# Patient Record
Sex: FEMALE | Race: WHITE | Employment: FULL TIME | URBAN - METROPOLITAN AREA
[De-identification: names, ages, dates, MRNs, and addresses within clinical notes are randomized per-mention and may not be internally consistent; named-entity substitution may affect disease eponyms.]

---

## 2017-01-20 RX ORDER — PANTOPRAZOLE SODIUM 20 MG/1
TABLET, DELAYED RELEASE ORAL
Qty: 60 TAB | Refills: 2 | Status: SHIPPED | OUTPATIENT
Start: 2017-01-20 | End: 2017-02-21 | Stop reason: SDUPTHER

## 2017-02-21 NOTE — TELEPHONE ENCOUNTER
Pt states she takes 40mg twice daily(2 of the 20mg caps). Can this be changed to a 40mg capsule instead of a 20mg capsule?

## 2017-02-23 RX ORDER — PANTOPRAZOLE SODIUM 20 MG/1
TABLET, DELAYED RELEASE ORAL
Qty: 60 TAB | Refills: 2 | Status: SHIPPED | OUTPATIENT
Start: 2017-02-23 | End: 2019-02-25

## 2017-04-19 ENCOUNTER — OFFICE VISIT (OUTPATIENT)
Dept: HEMATOLOGY | Age: 72
End: 2017-04-19

## 2017-04-19 VITALS
HEART RATE: 79 BPM | HEIGHT: 66 IN | OXYGEN SATURATION: 98 % | SYSTOLIC BLOOD PRESSURE: 150 MMHG | WEIGHT: 165.13 LBS | BODY MASS INDEX: 26.54 KG/M2 | TEMPERATURE: 97.4 F | DIASTOLIC BLOOD PRESSURE: 75 MMHG

## 2017-04-19 DIAGNOSIS — B18.2 CHRONIC HEPATITIS C WITHOUT HEPATIC COMA (HCC): Primary | ICD-10-CM

## 2017-04-19 LAB
BASOPHILS # BLD AUTO: 0 X10E3/UL (ref 0–0.2)
BASOPHILS NFR BLD AUTO: 1 %
EOSINOPHIL # BLD AUTO: 0.2 X10E3/UL (ref 0–0.4)
EOSINOPHIL NFR BLD AUTO: 4 %
ERYTHROCYTE [DISTWIDTH] IN BLOOD BY AUTOMATED COUNT: 15.7 % (ref 12.3–15.4)
HCT VFR BLD AUTO: 34.7 % (ref 34–46.6)
HGB BLD-MCNC: 11 G/DL (ref 11.1–15.9)
IMM GRANULOCYTES # BLD: 0 X10E3/UL (ref 0–0.1)
IMM GRANULOCYTES NFR BLD: 0 %
LYMPHOCYTES # BLD AUTO: 1.4 X10E3/UL (ref 0.7–3.1)
LYMPHOCYTES NFR BLD AUTO: 28 %
MCH RBC QN AUTO: 25.6 PG (ref 26.6–33)
MCHC RBC AUTO-ENTMCNC: 31.7 G/DL (ref 31.5–35.7)
MCV RBC AUTO: 81 FL (ref 79–97)
MONOCYTES # BLD AUTO: 0.4 X10E3/UL (ref 0.1–0.9)
MONOCYTES NFR BLD AUTO: 8 %
NEUTROPHILS # BLD AUTO: 3 X10E3/UL (ref 1.4–7)
NEUTROPHILS NFR BLD AUTO: 59 %
PLATELET # BLD AUTO: 293 X10E3/UL (ref 150–379)
RBC # BLD AUTO: 4.3 X10E6/UL (ref 3.77–5.28)
WBC # BLD AUTO: 4.9 X10E3/UL (ref 3.4–10.8)

## 2017-04-19 NOTE — MR AVS SNAPSHOT
Visit Information Date & Time Provider Department Dept. Phone Encounter #  
 4/19/2017 10:30 AM Juan Alberto Estes NP Liver [facility] [phone] [id number] Follow-up Instructions Return in about 9 months (around 1/19/2018). Your Appointments 4/19/2017 10:30 AM  
Follow Up with Juan Alberto Estes NP [facility] ([address]) Appt Note: follow up [address] [facility] [phone] [facility] [address]  
[address] [place] [address] [facility] [address]  
  
    
 1/18/2018  1:00 PM  
Follow Up with Jessie Phan NP [facility] ([address]) Appt Note: Follow up [address] [place] [phone] [facility] [address]  
[address] [place] [address] Upcoming Health Maintenance Date Due INFLUENZA AGE 9 TO ADULT 8/1/2016 Pneumococcal 65+ Low/Medium Risk (2 of 2 - PPSV23) 3/19/2017 FOBT Q 1 YEAR AGE 50-75 5/27/2017 GLAUCOMA SCREENING Q2Y 8/11/2017 MEDICARE YEARLY EXAM 8/12/2017 BREAST CANCER SCRN MAMMOGRAM 9/22/2018 DTaP/Tdap/Td series (2 - Td) 8/11/2025 Allergies as of 4/19/2017  Review Complete On: 4/19/2017 By: Ramy Choi LPN Severity Noted Reaction Type Reactions Ciprofloxacin  03/23/2014    Other (comments) Hand pain Methylprednisolone  04/11/2011    Other (comments) Dizziness and felt disoriented Naprosyn [Naproxen]  01/08/2010    Vertigo Unsure of reaction. Prevacid [Lansoprazole]  04/02/2010    Other (comments) Ulcers in mouth. Sulfa (Sulfonamide Antibiotics)  01/08/2010    Unknown (comments) As child. Unsure of reaction. Tegretol [Carbamazepine]  04/11/2011    Hives Current Immunizations  Reviewed on 1/26/2016 Name Date Influenza Vaccine 10/1/2013 Pneumococcal Vaccine (Unspecified Type) 3/19/2012, 1/1/2011 Tdap 8/11/2015 Zoster Vaccine, Live 8/11/2015 Not reviewed this visit You Were Diagnosed With   
  
 Codes Comments Chronic hepatitis C without hepatic coma (HCC)    -  Primary ICD-10-CM: B18.2 ICD-9-CM: 070.54 Vitals BP Pulse Temp Height(growth percentile) Weight(growth percentile) SpO2  
 150/75 79 97.4 °F (36.3 °C) (Tympanic) 5' 6\" (1.676 m) 165 lb 2 oz (74.9 kg) 98% BMI OB Status Smoking Status 26.65 kg/m2 Hysterectomy Former Smoker BMI and BSA Data Body Mass Index Body Surface Area  
 26.65 kg/m 2 1.87 m 2 Preferred Pharmacy Pharmacy Name Phone [place] [id number] [address] [phone] Your Updated Medication List  
  
   
This list is accurate as of: 4/19/17  9:10 AM.  Always use your most recent med list.  
  
  
  
  
 diazePAM 5 mg tablet Commonly known as:  VALIUM Take 1 Tab by mouth every six (6) hours as needed for Anxiety. ferrous sulfate 325 mg (65 mg iron) tablet Take  by mouth Daily (before breakfast). ibuprofen 600 mg tablet Commonly known as:  MOTRIN Take 1 Tab by mouth every six (6) hours as needed for Pain. ondansetron 4 mg disintegrating tablet Commonly known as:  ZOFRAN ODT Take 1 Tab by mouth every eight (8) hours as needed for Nausea. pantoprazole 20 mg tablet Commonly known as:  PROTONIX  
TAKE 1 TABLET EVERY DAY PARoxetine 20 mg tablet Commonly known as:  PAXIL TAKE 1 TABLET EVERY DAY  
  
 ROGAINE 2 % external solution Generic drug:  minoxidil Apply  to affected area daily. therapeutic multivitamin tablet Commonly known as:  [facility] Take 1 Tab by mouth daily. We Performed the Following CBC WITH AUTOMATED DIFF [54969 CPT(R)] HEP C GENOTYPE [id number] CPT(R)] HEPATIC FUNCTION PANEL [29296 CPT(R)] METABOLIC PANEL, BASIC [57285 CPT(R)] Follow-up Instructions Return in about 9 months (around 1/19/2018). Introducing [facility]! Dear Jose Parikh: Thank you for requesting a [employer] account. Our records indicate that you already have an active [employer] account. You can access your account anytime at [email or web address] Did you know that you can access your hospital and ER discharge instructions at any time in [employer]? You can also review all of your test results from your hospital stay or ER visit. Additional Information If you have questions, please visit the Frequently Asked Questions section of the [employer] website at [email or web address] Remember, [employer] is NOT to be used for urgent needs. For medical emergencies, dial 911. Now available from your iPhone and Android! Please provide this summary of care documentation to your next provider. Your primary care clinician is listed as John Whitfield. If you have any questions after today's visit, please call [phone].

## 2017-04-19 NOTE — PROGRESS NOTES
Sis Cortez MD, ZANA Gonzales PA-C Bernhard Reasons, MD, [address], Alla Runner, MD Amy Jarrett Short, NP Amber Finner, NP        [facility]     [address], [address] [address]     [phone]     FAX: [address], [address] [facility], [address]     [phone]     FAX: [phone]       Patient Care Team:  Kaur Holt MD as PCP - General (Family Practice)  Jeaneth Patrick MD (Gastroenterology)      Problem List  Date Reviewed: 11/2/2016          Codes Class Noted    Chronic hepatitis C ([facility] [address] ICD-10-CM: B18.2  ICD-9-CM: 070.54  10/3/2016        S/P JESUS (total abdominal hysterectomy) ICD-10-CM: Z90.710  ICD-9-CM: V88.01  10/3/2016        H/O cervical spine surgery ICD-10-CM: Z98.890  ICD-9-CM: V45.89  10/3/2016        GI bleed ICD-10-CM: K92.2  ICD-9-CM: 578.9  1/24/2016    Overview Addendum 8/11/2016 11:57 AM by Angeline Devine MD     Duodenal AVMs. In 1/16, Hgb dropped from 13 to 5.9 during a bleed. 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 She began HCV on 11/13/2016 with once daily Harvoni and completed the 8 weeks of therapy on 01/07/2017. The HCV has not been treated before this.       The active problem list, all pertinent past medical history, medications, liver histology, endoscopic studies, radiologic findings and laboratory findings related to the liver disorder were reviewed with the patient. The patient is a 73 y.o.  female who was tested positive for chronic HCV during birth cohort screening in 8/2016. Risk factors for acquiring HCV are blood transfusions following childbirth in 1965. There was no history of acute incteric hepatitis at the time of these risk factors. Ultrasound of the liver was performed in 8/2016. The results of the imaging demonstrated a normal appearing liver. A liver biopsy has not been performed. Fibroscan analysis wasperformed in lieu of biopsy. The fibroscan suggests mild disease, F1/F2. Portal fibrosis. The patient has never received treatment for chronic HCV. The most recent laboratory studies indicate that the liver transaminases are normal, alkaline phosphatase is normal, tests of hepatic synthetic and metabolic function are normal, and the platelet count is normal.      The patient has no symptoms which can be attributed to the liver disorder. The patient completes all daily activities without any functional limitations. The patient has not experienced fatigue, fevers, chills, shortness of breath, chest pain, pain in the right side over the liver, diffuse abdominal pain, nausea, vomiting, constipation, diarrhrea, dry eyes, dry mouth, arthralgias, myalgias, yellowing of the eyes or skin, itching, dark urine, problems concentrating, swelling of the abdomen, swelling of the lower extremities, hematemesis, or hematochezia. ALLERGIES  Allergies   Allergen Reactions    Ciprofloxacin Other (comments)     Hand pain    Methylprednisolone Other (comments)     Dizziness and felt disoriented    Naprosyn [Naproxen] Vertigo     Unsure of reaction.  Prevacid [Lansoprazole] Other (comments)     Ulcers in mouth.  Sulfa (Sulfonamide Antibiotics) Unknown (comments)     As child. Unsure of reaction.     Tegretol [Carbamazepine] Hives MEDICATIONS  Current Outpatient Prescriptions   Medication Sig    pantoprazole (PROTONIX) 20 mg tablet TAKE 1 TABLET EVERY DAY    ibuprofen (MOTRIN) 600 mg tablet Take 1 Tab by mouth every six (6) hours as needed for Pain.  PARoxetine (PAXIL) 20 mg tablet TAKE 1 TABLET EVERY DAY    ferrous sulfate 325 mg (65 mg iron) tablet Take  by mouth Daily (before breakfast).  ondansetron (ZOFRAN ODT) 4 mg disintegrating tablet Take 1 Tab by mouth every eight (8) hours as needed for Nausea.  therapeutic multivitamin (THERAGRAN) tablet Take 1 Tab by mouth daily.  diazepam (VALIUM) 5 mg tablet Take 1 Tab by mouth every six (6) hours as needed for Anxiety.  minoxidil (ROGAINE) 2 % external solution Apply  to affected area daily. No current facility-administered medications for this visit. SYSTEM REVIEW NOT RELATED TO LIVER DISEASE OR REVIEWED ABOVE:  Constitution systems: Negative for fever, chills, weight gain, weight loss. Eyes: Negative for visual changes. ENT: Negative for sore throat, painful swallowing. Respiratory: Negative for cough, hemoptysis, SOB. Cardiology: Negative for chest pain, palpitations. GI:  Negative for constipation or diarrhea. : Negative for urinary frequency, dysuria, hematuria, nocturia. Skin: Negative for rash. Hematology: Negative for easy bruising, blood clots. Musculo-skelatal: Negative for back pain, muscle pain, weakness. Neurologic: Negative for headaches, dizziness, vertigo, memory problems not related to HE. Psychology: Negative for anxiety, depression. FAMILY HISTORY:  The father  of alcoholic cirrhosis. The mother  of at age 80 years. There is no family history of liver disease. SOCIAL HISTORY:  The patient is . The patient has 2 children, 1 of whom is   The patient stopped using tobacco products in . The patient consumes alcohol on social occasions never in excess.     The patient currently works full time in [place] for [employer]. PHYSICAL EXAMINATION:  Visit Vitals    /75    Pulse 79    Temp 97.4 °F (36.3 °C) (Tympanic)    Ht 5' 6\" (1.676 m)    Wt 165 lb 2 oz (74.9 kg)    SpO2 98%    BMI 26.65 kg/m2     General: No acute distress. Eyes: Sclera anicteric. ENT: No oral lesions. Thyroid normal.  Nodes: No adenopathy. Skin: No spider angiomata. No jaundice. No palmar erythema. Respiratory: Lungs clear to auscultation. Cardiovascular: Regular heart rate. No murmurs. No JVD. Abdomen: Soft non-tender. Liver size normal to percussion/palpation. Spleen not palpable. No obvious ascites. Extremities: No edema. No muscle wasting. No gross arthritic changes. Neurologic: Alert and oriented. Cranial nerves grossly intact. No asterixis. LABORATORY STUDIES:  Liver [place] of [address] [place] [address] 12/7/2016 10/3/2016 8/6/2016   WBC 3.4 - 10.8 x10E3/uL 5.5 6.1 5.9   ANC 1.4 - 7.0 x10E3/uL 2.9 3.3 4.3   HGB 11.1 - 15.9 g/dL 12.9 13.2 11.8    - 379 x10E3/uL 266 262 226   INR 0.9 - 1.1        AST 0 - 40 IU/L 22 32 34   ALT 0 - 32 IU/L 14 34 (H) 39   Alk Phos 39 - 117 IU/L 51 61 66   Bili, Total 0.0 - 1.2 mg/dL 0.5 0.5 0.6   Bili, Direct 0.00 - 0.40 mg/dL 0.13 0.13    Albumin 3.6 - 4.8 g/dL 4.6 4.4 3.7   BUN 8 - 27 mg/dL 13 16 22 (H)   Creat 0.57 - 1.00 mg/dL 0.63 0.72 0.67   Na 136 - 144 mmol/L 142 142 142   K 3.5 - 5.2 mmol/L 4.5 4.5 4.2   Cl 97 - 106 mmol/L 104 103 108   CO2 18 - 29 mmol/L 26 23 26   Glucose 65 - 99 mg/dL 83 73 113 (H)   Magnesium 1.6 - 2.4 mg/dL   2.2     SEROLOGIES:  Serologies Latest Ref Rng 10/3/2016   Hep B Surface Ag Negative Negative   Hep C Genotype  1a   HCV RT-PCR, Quant IU/mL [id number]     LIVER HISTOLOGY:  11/2016. FibroScan performed at [address]. EkPa was 6.9. Suggested fibrosis level is F1/F2. ENDOSCOPIC PROCEDURES:  Not available or performed    RADIOLOGY:  4/2016. Ultrasound of liver.   Normal appearing liver. No liver mass lesions. OTHER TESTING:  Not available or performed    ASSESSMENT AND PLAN:  Chronic HCV with portal fibrosis. The most recent laboratory studies indicate that the liver transaminases are normal, alkaline phosphatase is normal, tests of hepatic synthetic and metabolic function are normal, and the platelet count is normal.  Will perform laboratory testing to monitor liver function and degree of liver injury. This will include hepatic panel, a CBC w/ diff, a BMP, and HCV RNA. Fibroscan analysis suggests mild hepatic fibrosis, F/F2. EkPa is 6.9.      HCV. The patient has genotype 1A and has not previously been treated. She began HCV on 11/13/2016 with once daily Harvoni. She was treated for 8 weeks total and completed the therapy on 01/07/2017. She had no treatment related complaints. The patient was directed to continue all current medications at the current dosages. There are no contraindications for the patient to take any medications that are necessary for treatment of other medical issues. The patient was counseled regarding alcohol consumption. The need for vaccination against viral hepatitis A and B will be assessed with serologic and instituted as appropriate. All of the above issues were discussed with the patient. All questions were answered. The patient expressed a clear understanding of the above. [address] [place] [address]24 in 9 months to assess for continued SVR one year post treatment.         Flavio Vázquez, FNP-C   Liver [place] [address]  [phone]

## 2017-04-20 LAB
ALBUMIN SERPL-MCNC: 4.3 G/DL (ref 3.6–4.8)
ALP SERPL-CCNC: 53 IU/L (ref 39–117)
ALT SERPL-CCNC: 13 IU/L (ref 0–32)
AST SERPL-CCNC: 17 IU/L (ref 0–40)
BILIRUB DIRECT SERPL-MCNC: 0.13 MG/DL (ref 0–0.4)
BILIRUB SERPL-MCNC: 0.5 MG/DL (ref 0–1.2)
BUN SERPL-MCNC: 16 MG/DL (ref 8–27)
BUN/CREAT SERPL: 23 (ref 12–28)
CALCIUM SERPL-MCNC: 9 MG/DL (ref 8.7–10.3)
CHLORIDE SERPL-SCNC: 102 MMOL/L (ref 96–106)
CO2 SERPL-SCNC: 24 MMOL/L (ref 18–29)
CREAT SERPL-MCNC: 0.69 MG/DL (ref 0.57–1)
GLUCOSE SERPL-MCNC: 104 MG/DL (ref 65–99)
POTASSIUM SERPL-SCNC: 4.9 MMOL/L (ref 3.5–5.2)
PROT SERPL-MCNC: 6.9 G/DL (ref 6–8.5)
SODIUM SERPL-SCNC: 141 MMOL/L (ref 134–144)

## 2017-04-26 LAB
HCV GENTYP SERPL NAA+PROBE: NORMAL
PLEASE NOTE, 550474: NORMAL

## 2017-05-19 ENCOUNTER — TELEPHONE (OUTPATIENT)
Dept: HEMATOLOGY | Age: 72
End: 2017-05-19

## 2017-09-21 RX ORDER — PAROXETINE HYDROCHLORIDE 20 MG/1
TABLET, FILM COATED ORAL
Qty: 90 TAB | Refills: 3 | Status: SHIPPED | OUTPATIENT
Start: 2017-09-21 | End: 2017-09-25 | Stop reason: SDUPTHER

## 2017-09-25 RX ORDER — PAROXETINE HYDROCHLORIDE 20 MG/1
TABLET, FILM COATED ORAL
Qty: 7 TAB | Refills: 0 | Status: SHIPPED | OUTPATIENT
Start: 2017-09-25 | End: 2018-09-26 | Stop reason: SDUPTHER

## 2017-09-26 NOTE — TELEPHONE ENCOUNTER
Pt notified and verbalized understanding. [address] scheduled with Dr. Dk Coello on Tuesday 10-24-17 at 10:15.

## 2017-10-06 ENCOUNTER — TELEPHONE (OUTPATIENT)
Dept: FAMILY MEDICINE CLINIC | Age: 72
End: 2017-10-06

## 2017-12-15 ENCOUNTER — HOSPITAL ENCOUNTER (OUTPATIENT)
Dept: MAMMOGRAPHY | Age: 72
Discharge: HOME | End: 2017-12-15
Attending: FAMILY MEDICINE
Payer: MEDICARE

## 2017-12-15 DIAGNOSIS — Z12.39 SCREENING BREAST EXAMINATION: ICD-10-CM

## 2017-12-15 PROCEDURE — 77067 SCR MAMMO BI INCL CAD: CPT

## 2018-01-25 ENCOUNTER — ANESTHESIA EVENT (OUTPATIENT)
Dept: SURGERY | Age: 73
End: 2018-01-25
Payer: MEDICARE

## 2018-01-25 NOTE — PERIOP NOTES
[facility]  Ambulatory Surgery Unit  Pre-operative Instructions for Endo Procedures    Procedure Date  01/26/2018            Tentative Arrival Time 1300      1. On the day of your procedure, please report to the Ambulatory Surgery Unit Registration Desk and sign in at your designated time. The Ambulatory Surgery Unit is located in [facility] on the [facility] side of the [facility] across from the [address] [place] [address]. Please have all of your health insurance cards and a photo ID. 2. You must have someone with you to drive you home, as you should not drive a car for 24 hours following anesthesia. Please make arrangements for a responsible adult friend or family member to stay with you for at least the first 24 hours after your procedure. 3. Do not have anything to eat or drink (including water, gum, mints, coffee, juice) after midnight   01/25/2018. This may not apply to medications prescribed by your physician. (Please note below the special instructions with medications to take the morning of your procedure.)    4. If applicable, follow the clear liquid diet and bowel prep instructions provided by your physician's office. If you do not have this information, or have any questions, please contact your physician's office. 5. We recommend you do not drink any alcoholic beverages for 24 hours before and after your procedure. 6. Contact your surgeons office for instructions on the following medications: non-steroidal anti-inflammatory drugs (i.e. Advil, Aleve), vitamins, and supplements. (Some surgeons will want you to stop these medications prior to surgery and others may allow you to take them)   **If you are currently taking Plavix, Coumadin, Aspirin and/or other blood-thinning agents, contact your surgeon for instructions. ** Your surgeon will partner with the physician prescribing these medications to determine if it is safe to stop or if you need to continue taking.  Please do not stop taking these medications without instructions from your surgeon. 7. In an effort to help prevent surgical site infection, we ask that you shower with an anti-bacterial soap (i.e. Dial or Safeguard) on the morning of your procedure. Do not apply any lotions, powders, or deodorants after showering. 8. Wear comfortable clothes. Wear glasses instead of contacts. Do not bring any jewelry or money (other than copays or fees as instructed). Do not wear make-up, particularly mascara, the morning of your procedure. Wear your hair loose or down, no ponytails, buns, dario pins or clips. All body piercings must be removed. 9. You should understand that if you do not follow these instructions your procedure may be cancelled. If your physical condition changes (i.e. fever, cold or flu) please contact your surgeon as soon as possible. 10. It is important that you be on time. If a situation occurs where you may be late, or if you have any questions or problems, please call [phone]. 11. Your procedure time may be subject to change. You will receive a phone call the day prior to confirm your arrival time. Special Instructions: Take all medications and inhalers, as prescribed, on the morning of surgery with a sip of water. I understand a pre-operative phone call will be made to verify my procedure time. In the event that I am not available, I give permission for a message to be left on my answering service and/or with another person? yes         ___________________      ___________________      ___________________  Pt verbalized understanding of preop instructions via telephone.   (Signature of Patient)          (Witness)                   (Date and Time)

## 2018-01-26 ENCOUNTER — HOSPITAL ENCOUNTER (OUTPATIENT)
Age: 73
Setting detail: OUTPATIENT SURGERY
Discharge: HOME | End: 2018-01-26
Attending: SPECIALIST | Admitting: SPECIALIST
Payer: MEDICARE

## 2018-01-26 ENCOUNTER — ANESTHESIA (OUTPATIENT)
Dept: SURGERY | Age: 73
End: 2018-01-26
Payer: MEDICARE

## 2018-01-26 VITALS
HEIGHT: 66 IN | BODY MASS INDEX: 26.52 KG/M2 | TEMPERATURE: 97.7 F | OXYGEN SATURATION: 99 % | HEART RATE: 82 BPM | SYSTOLIC BLOOD PRESSURE: 128 MMHG | WEIGHT: 165 LBS | DIASTOLIC BLOOD PRESSURE: 81 MMHG | RESPIRATION RATE: 15 BRPM

## 2018-01-26 PROCEDURE — 88305 TISSUE EXAM BY PATHOLOGIST: CPT | Performed by: SPECIALIST

## 2018-01-26 PROCEDURE — 74011250636 HC RX REV CODE- 250/636

## 2018-01-26 PROCEDURE — 76210000040 HC AMBSU PH I REC FIRST 0.5 HR: Performed by: SPECIALIST

## 2018-01-26 PROCEDURE — 74011250636 HC RX REV CODE- 250/636: Performed by: ANESTHESIOLOGY

## 2018-01-26 PROCEDURE — 76210000050 HC AMBSU PH II REC 0.5 TO 1 HR: Performed by: SPECIALIST

## 2018-01-26 PROCEDURE — 77030009426 HC FCPS BIOP ENDOSC BSC -B: Performed by: SPECIALIST

## 2018-01-26 PROCEDURE — 74011000250 HC RX REV CODE- 250

## 2018-01-26 PROCEDURE — 76060000073 HC AMB SURG ANES FIRST 0.5 HR: Performed by: SPECIALIST

## 2018-01-26 PROCEDURE — 77030021352 HC CBL LD SYS DISP COVD -B: Performed by: SPECIALIST

## 2018-01-26 PROCEDURE — 77030020255 HC SOL INJ LR 1000ML BG: Performed by: SPECIALIST

## 2018-01-26 PROCEDURE — 76030000002 HC AMB SURG OR TIME FIRST 0.: Performed by: SPECIALIST

## 2018-01-26 RX ORDER — SODIUM CHLORIDE 0.9 % (FLUSH) 0.9 %
5-10 SYRINGE (ML) INJECTION EVERY 8 HOURS
Status: DISCONTINUED | OUTPATIENT
Start: 2018-01-26 | End: 2018-01-26 | Stop reason: HOSPADM

## 2018-01-26 RX ORDER — SODIUM CHLORIDE, SODIUM LACTATE, POTASSIUM CHLORIDE, CALCIUM CHLORIDE 600; 310; 30; 20 MG/100ML; MG/100ML; MG/100ML; MG/100ML
25 INJECTION, SOLUTION INTRAVENOUS CONTINUOUS
Status: DISCONTINUED | OUTPATIENT
Start: 2018-01-26 | End: 2018-01-26 | Stop reason: HOSPADM

## 2018-01-26 RX ORDER — FENTANYL CITRATE 50 UG/ML
25 INJECTION, SOLUTION INTRAMUSCULAR; INTRAVENOUS
Status: DISCONTINUED | OUTPATIENT
Start: 2018-01-26 | End: 2018-01-26 | Stop reason: HOSPADM

## 2018-01-26 RX ORDER — MECLIZINE HYDROCHLORIDE 25 MG/1
25 TABLET ORAL DAILY
COMMUNITY

## 2018-01-26 RX ORDER — ONDANSETRON 2 MG/ML
4 INJECTION INTRAMUSCULAR; INTRAVENOUS ONCE
Status: COMPLETED | OUTPATIENT
Start: 2018-01-26 | End: 2018-01-26

## 2018-01-26 RX ORDER — SODIUM CHLORIDE 0.9 % (FLUSH) 0.9 %
5-10 SYRINGE (ML) INJECTION AS NEEDED
Status: DISCONTINUED | OUTPATIENT
Start: 2018-01-26 | End: 2018-01-26 | Stop reason: HOSPADM

## 2018-01-26 RX ORDER — PROPOFOL 10 MG/ML
INJECTION, EMULSION INTRAVENOUS AS NEEDED
Status: DISCONTINUED | OUTPATIENT
Start: 2018-01-26 | End: 2018-01-26 | Stop reason: HOSPADM

## 2018-01-26 RX ORDER — ONDANSETRON 2 MG/ML
4 INJECTION INTRAMUSCULAR; INTRAVENOUS AS NEEDED
Status: DISCONTINUED | OUTPATIENT
Start: 2018-01-26 | End: 2018-01-26 | Stop reason: HOSPADM

## 2018-01-26 RX ORDER — DIPHENHYDRAMINE HYDROCHLORIDE 50 MG/ML
12.5 INJECTION, SOLUTION INTRAMUSCULAR; INTRAVENOUS AS NEEDED
Status: DISCONTINUED | OUTPATIENT
Start: 2018-01-26 | End: 2018-01-26 | Stop reason: HOSPADM

## 2018-01-26 RX ORDER — LIDOCAINE HYDROCHLORIDE 10 MG/ML
0.1 INJECTION, SOLUTION EPIDURAL; INFILTRATION; INTRACAUDAL; PERINEURAL AS NEEDED
Status: DISCONTINUED | OUTPATIENT
Start: 2018-01-26 | End: 2018-01-26 | Stop reason: HOSPADM

## 2018-01-26 RX ORDER — SODIUM CHLORIDE 9 MG/ML
50 INJECTION, SOLUTION INTRAVENOUS CONTINUOUS
Status: DISCONTINUED | OUTPATIENT
Start: 2018-01-26 | End: 2018-01-26 | Stop reason: HOSPADM

## 2018-01-26 RX ORDER — DEXTROMETHORPHAN/PSEUDOEPHED 2.5-7.5/.8
1.2 DROPS ORAL
Status: DISCONTINUED | OUTPATIENT
Start: 2018-01-26 | End: 2018-01-26 | Stop reason: HOSPADM

## 2018-01-26 RX ORDER — LIDOCAINE HYDROCHLORIDE 20 MG/ML
INJECTION, SOLUTION EPIDURAL; INFILTRATION; INTRACAUDAL; PERINEURAL AS NEEDED
Status: DISCONTINUED | OUTPATIENT
Start: 2018-01-26 | End: 2018-01-26 | Stop reason: HOSPADM

## 2018-01-26 RX ADMIN — ONDANSETRON 4 MG: 2 INJECTION INTRAMUSCULAR; INTRAVENOUS at 13:30

## 2018-01-26 RX ADMIN — LIDOCAINE HYDROCHLORIDE 60 MG: 20 INJECTION, SOLUTION EPIDURAL; INFILTRATION; INTRACAUDAL; PERINEURAL at 15:05

## 2018-01-26 RX ADMIN — PROPOFOL 280 MG: 10 INJECTION, EMULSION INTRAVENOUS at 15:15

## 2018-01-26 RX ADMIN — SODIUM CHLORIDE, SODIUM LACTATE, POTASSIUM CHLORIDE, AND CALCIUM CHLORIDE 25 ML/HR: 600; 310; 30; 20 INJECTION, SOLUTION INTRAVENOUS at 12:59

## 2018-01-26 NOTE — DISCHARGE INSTRUCTIONS
Brigida Peabody Sweet  [id number]  1945    EGD DISCHARGE INSTRUCTIONS  Discomfort:  Sore throat- throat lozenges or warm salt water gargle  redness at IV site- apply warm compress to area; if redness or soreness persist- contact your physician  Gaseous discomfort- walking, belching will help relieve any discomfort  You may not operate a vehicle for 24 hours  You may not engage in an occupation involving machinery or appliances for rest of today. You may not drink alcoholic beverages for at least 24 hours  Avoid making any critical decisions for at least 24 hour  DIET  You may resume your regular diet - however -  remember your colon is empty and a heavy meal will produce gas. Avoid these foods:  vegetables, fried / greasy foods, carbonated drinks  MEDICATIONS        Regarding Aspirin or Nonsteroidal medications specifically, please see below. ACTIVITY  You may resume your normal daily activities. Spend the remainder of the day resting -  avoid any strenuous activity. CALL M.D. ANY SIGN OF   Increasing pain, nausea, vomiting  Abdominal distension (swelling)  New increased bleeding (oral or rectal)  Fever (chills)  Pain in chest area  Bloody discharge from nose or mouth  Shortness of breath  ONLY  Tylenol as needed for pain. Follow-up Instructions:   Call Dr. Chandler Boateng for results of procedure / biopsy in 4-5 days at telephone #  [phone]              Make a followup with your primary care physician to check your blood count. Continue on iron supplement daily. DO NOT TAKE SLEEPING MEDICATIONS OR ANTIANXIETY MEDICATIONS WHILE TAKING NARCOTIC PAIN MEDICATIONS,  ESPECIALLY THE NIGHT OF ANESTHESIA. CPAP PATIENTS BE SURE TO WEAR MACHINE WHENEVER NAPPING OR SLEEPING.     DISCHARGE SUMMARY from Nurse    The following personal items collected during your admission are returned to you:   Dental Appliance: Dental Appliances: Uppers, With patient  Vision: Visual Aid: None  Hearing Aid:    Jewelry:    Clothing:    Other Valuables:    Valuables sent to safe:        PATIENT INSTRUCTIONS:    After General Anesthesia or Intravenous Sedation, for 24 hours or while taking prescription Narcotics:        Someone should be with you for the next 24 hours. For your own safety, a responsible adult must drive you home. · Limit your activities  · Recommended activity: Rest today, up with assistance today. Do not climb stairs or shower unattended for the next 24 hours. · Please start with a soft bland diet and advance as tolerated (no nausea) to regular diet. · If you have a sore throat you should try the following: fluids, warm salt water gargles, or throat lozenges. If it does not improve after several days please follow up with your primary physician. · Do not drive and operate hazardous machinery  · Do not make important personal or business decisions  · Do  not drink alcoholic beverages  · If you have not urinated within 8 hours after discharge, please contact your surgeon on call. Report the following to your surgeon:  · Excessive pain, swelling, redness or odor of or around the surgical area  · Temperature over 100.5  · Nausea and vomiting lasting longer than 4 hours or if unable to take medications  · Any signs of decreased circulation or nerve impairment to extremity: change in color, persistent  numbness, tingling, coldness or increase pain      · You will receive a Post Operative Call from one of the Recovery Room Nurses on the day after your surgery to check on you. It is very important for us to know how you are recovering after your surgery. If you have an issue or need to speak with someone, please call your surgeon, do not wait for the post operative call. · You may receive an e-mail or letter in the mail from Anya regarding your experience with us in the Ambulatory Surgery Unit.  Your feedback is valuable to us and we appreciate your participation in the survey. · If the above instructions are not adequate, please contact Belle Pablo RN, [place] anesthesia Nurse Manager or our Anesthesiologist, at [phone]. If you are having problems after your surgery, call the physician at his office number. · We wish you a speedy recovery ? What to do at Home:      *  Please give a list of your current medications to your Primary Care Provider. *  Please update this list whenever your medications are discontinued, doses are      changed, or new medications (including over-the-counter products) are added. *  Please carry medication information at all times in case of emergency situations. These are general instructions for a healthy lifestyle:    No smoking/ No tobacco products/ Avoid exposure to second hand smoke    Surgeon General's Warning:  Quitting smoking now greatly reduces serious risk to your health. Obesity, smoking, and sedentary lifestyle greatly increases your risk for illness    A healthy diet, regular physical exercise & weight monitoring are important for maintaining a healthy lifestyle    You may be retaining fluid if you have a history of heart failure or if you experience any of the following symptoms:  Weight gain of 3 pounds or more overnight or 5 pounds in a week, increased swelling in our hands or feet or shortness of breath while lying flat in bed. Please call your doctor as soon as you notice any of these symptoms; do not wait until your next office visit. Recognize signs and symptoms of STROKE:    B - Balance  E - Eyes    F-  Face looks uneven    A-  Arms unable to move or move even    S-  Speech slurred or non-existent    T-  Time-call 911 as soon as signs and symptoms begin-DO NOT go       Back to bed or wait to see if you get better-TIME IS BRAIN. If you have not received your influenza and/or pneumococcal vaccine, please follow up with your primary care physician.       The discharge information has been reviewed with the patient and spouse. The patient and spouse verbalized understanding.

## 2018-01-26 NOTE — IP AVS SNAPSHOT
[address] [facility]. 
[phone] Patient: Bruno Cabral MRN: [id number] [facility]:[birth year] About your hospitalization You were admitted on:  January 26, 2018 You last received care in the:  [facility] PACU You were discharged on:  January 26, 2018 Why you were hospitalized Your primary diagnosis was:  Not on File Follow-up Information None Discharge Orders None A check re indicates which time of day the medication should be taken. My Medications CONTINUE taking these medications Instructions Each Dose to Equal  
 Morning Noon Evening Bedtime  
 diazePAM 5 mg tablet Commonly known as:  VALIUM Your last dose was: Your next dose is: Take 1 Tab by mouth every six (6) hours as needed for Anxiety. 5 mg  
    
   
   
   
  
 ferrous sulfate 325 mg (65 mg iron) tablet Your last dose was: Your next dose is: Take  by mouth Daily (before breakfast). ibuprofen 600 mg tablet Commonly known as:  MOTRIN Your last dose was: Your next dose is: Take 1 Tab by mouth every six (6) hours as needed for Pain. 600 mg  
    
   
   
   
  
 meclizine 25 mg tablet Commonly known as:  ANTIVERT Your last dose was: Your next dose is: Take 25 mg by mouth daily. 25 mg  
    
   
   
   
  
 ondansetron 4 mg disintegrating tablet Commonly known as:  ZOFRAN ODT Your last dose was: Your next dose is: Take 1 Tab by mouth every eight (8) hours as needed for Nausea. 4 mg  
    
   
   
   
  
 pantoprazole 20 mg tablet Commonly known as:  PROTONIX Your last dose was: Your next dose is: TAKE 1 TABLET EVERY DAY PARoxetine 20 mg tablet Commonly known as:  PAXIL Your last dose was: Your next dose is: TAKE 1 TABLET EVERY DAY  
     
   
   
   
  
 ROGAINE 2 % external solution Generic drug:  minoxidil Your last dose was: Your next dose is:    
   
   
 Apply  to affected area daily. therapeutic multivitamin tablet Commonly known as:  [facility] Your last dose was: Your next dose is: Take 1 Tab by mouth daily. 1 Tab Discharge Instructions Vera Ortiz 
[id number] 
1945 EGD DISCHARGE INSTRUCTIONS Discomfort: 
Sore throat- throat lozenges or warm salt water gargle 
redness at IV site- apply warm compress to area; if redness or soreness persist- contact your physician Gaseous discomfort- walking, belching will help relieve any discomfort You may not operate a vehicle for 24 hours You may not engage in an occupation involving machinery or appliances for rest of today. You may not drink alcoholic beverages for at least 24 hours Avoid making any critical decisions for at least 24 hour DIET You may resume your regular diet  however -  remember your colon is empty and a heavy meal will produce gas. Avoid these foods:  vegetables, fried / greasy foods, carbonated drinks MEDICATIONS Regarding Aspirin or Nonsteroidal medications specifically, please see below. ACTIVITY You may resume your normal daily activities. Spend the remainder of the day resting -  avoid any strenuous activity. CALL M.D. ANY SIGN OF Increasing pain, nausea, vomiting Abdominal distension (swelling) New increased bleeding (oral or rectal) Fever (chills) Pain in chest area Bloody discharge from nose or mouth Shortness of breath ONLY  Tylenol as needed for pain. Follow-up Instructions: 
 Call Dr. Mahnaz Negron for results of procedure / biopsy in 4-5 days at telephone #  [phone] Make a followup with your primary care physician to check your blood count. Continue on iron supplement daily. DO NOT TAKE SLEEPING MEDICATIONS OR ANTIANXIETY MEDICATIONS WHILE TAKING NARCOTIC PAIN MEDICATIONS,  ESPECIALLY THE NIGHT OF ANESTHESIA. CPAP PATIENTS BE SURE TO WEAR MACHINE WHENEVER NAPPING OR SLEEPING. DISCHARGE SUMMARY from Nurse The following personal items collected during your admission are returned to you:  
Dental Appliance: Dental Appliances: Uppers, With patient Vision: Visual Aid: None Hearing Aid:   
Jewelry:   
Clothing:   
Other Valuables:   
Valuables sent to safe:   
 
 
PATIENT INSTRUCTIONS: 
 
 
B - Balance E - Eyes F-  Face looks uneven A-  Arms unable to move or move even S-  Speech slurred or non-existent T-  Time-call 911 as soon as signs and symptoms begin-DO NOT go Back to bed or wait to see if you get better-TIME IS BRAIN. If you have not received your influenza and/or pneumococcal vaccine, please follow up with your primary care physician. The discharge information has been reviewed with the patient and spouse. The patient and spouse verbalized understanding. ACO Transitions of Care Introducing [employer] [address] offers a voluntary care coordination program to provide high quality service and care to [facility] fee-for-service beneficiaries. Sandor Masters was designed to help you enhance your health and well-being through the following services: ? Transitions of Care  support for individuals who are transitioning from one care setting to another (example: Hospital to home). ? Chronic and Complex Care Coordination  support for individuals and caregivers of those with serious or chronic illnesses or with more than one chronic (ongoing) condition and those who take a number of different medications. If you meet specific medical criteria, a [facility] may call you directly to coordinate your care with your primary care physician and your other care providers. For questions about the [facility] programs, please, contact your physicians office. For general questions or additional information about Accountable Care Organizations: 
Please visit www.medicare.gov/acos. html or call 1-800-MEDICARE (0-353.350.9691) TTY users should call 7-335.598.9754. Introducing [facility]! Dear Saima Prakash: Thank you for requesting a [employer] account. Our records indicate that you already have an active [employer] account. You can access your account anytime at [email or web address] Did you know that you can access your hospital and ER discharge instructions at any time in [employer]? You can also review all of your test results from your hospital stay or ER visit. Additional Information If you have questions, please visit the Frequently Asked Questions section of the [employer] website at [email or web address] Remember, [employer] is NOT to be used for urgent needs. For medical emergencies, dial 911. Now available from your iPhone and Android! Providers Seen During Your Hospitalization Provider Specialty Primary office phone Leoncio Lee MD Gastroenterology [phone] Your Primary Care Physician (PCP) Primary Care Physician Office Phone Office Fax Antonella Lizarraga [phone] You are allergic to the following Allergen Reactions Ciprofloxacin Other (comments) Hand pain Methylprednisolone Other (comments) Dizziness and felt disoriented Naprosyn (Naproxen) Vertigo Unsure of reaction. Prevacid (Lansoprazole) Other (comments) Ulcers in mouth. Sulfa (Sulfonamide Antibiotics) Unknown (comments) As child. Unsure of reaction. Tegretol (Carbamazepine) Hives Recent Documentation Height Weight BMI OB Status Smoking Status 1.676 m 74.8 kg 26.63 kg/m2 Hysterectomy Former Smoker Emergency Contacts Name Discharge Info Relation Home Work Mobile Ronak Hector CAREGIVER [3] Boyfriend [17] [phone] Patient Belongings The following personal items are in your possession at time of discharge: 
  Dental Appliances: Uppers, With patient  Visual Aid: None   Hearing Aids/Status: Does not own Please provide this summary of care documentation to your next provider. Signatures-by signing, you are acknowledging that this After Visit Summary has been reviewed with you and you have received a copy. Patient Signature:  ____________________________________________________________ Date:  ____________________________________________________________  
  
[place] [facility] Provider Signature:  ____________________________________________________________ Date:  ____________________________________________________________

## 2018-01-26 NOTE — PERIOP NOTES
Christel Coffey Soraya  1945  [id number]    Situation:  Verbal report given from: RAVINDRA Mejia CRNA  Procedure: Procedure(s):  ESOPHAGOGASTRODUODENOSCOPY (EGD)    Background:    Preoperative diagnosis: IRON DEFICIENCY ANEMIA, GERD    Postoperative diagnosis: * No post-op diagnosis entered *    :  Dr. Deidre Jiménez    Assistant(s): Circ-1: April Feliciano RN  Circ-2: Kurt Mehta  Circ-Relief: Zoya Sheppard RN  Scrub Tech-1: Jeff Staple    Specimens: * No specimens in log *    Assessment:  Intra-procedure medications         Anesthesia gave intra-procedure sedation and medications, see anesthesia flow sheet     Intravenous fluids: LR@ KVO     Vital signs stable       Recommendation:    Permission to share finding with family or friend yes

## 2018-01-26 NOTE — ANESTHESIA PREPROCEDURE EVALUATION
Anesthetic History     PONV          Review of Systems / Medical History  Patient summary reviewed, nursing notes reviewed and pertinent labs reviewed    Pulmonary  Within defined limits                 Neuro/Psych         Psychiatric history (anxiety/ panic attacks)     Cardiovascular  Within defined limits                Exercise tolerance: >4 METS     GI/Hepatic/Renal     GERD  Hepatitis: type C        Comments: [facility]/Other        Anemia (iron def)     Other Findings              Physical Exam    Airway  Mallampati: I  TM Distance: 4 - 6 cm  Neck ROM: decreased range of motion   Mouth opening: Normal     Cardiovascular    Rhythm: regular  Rate: normal      Pertinent negatives: No murmur   Dental    Dentition: Full upper dentures     Pulmonary  Breath sounds clear to auscultation               Abdominal  GI exam deferred       Other Findings            Anesthetic Plan    ASA: 2  Anesthesia type: general and total IV anesthesia          Induction: Intravenous  Anesthetic plan and risks discussed with: Patient      zofran IV preop for nausea

## 2018-01-26 NOTE — PROCEDURES
Esophagogastroduodenoscopy Procedure Note      Harriet Lira  1945  [id number]    Indication:  Iron deficiency anemia     Endoscopist: Igor Bae MD    Referring Provider:  Fidencio Sousa MD    Sedation:  MAC anesthesia Propofol    Procedure Details:  After infomed consent was obtained for the procedure, with all risks and benefits of procedure explained the patient was taken to the endoscopy suite and placed in the left lateral decubitus position. Following sequential administration of sedation as per above, the endoscope was inserted into the mouth and advanced under direct vision to second portion of the duodenum. A careful inspection was made as the gastroscope was withdrawn, including a retroflexed view of the proximal stomach; findings and interventions are described below. Findings:     Esophagus:   + There was an irregular Z line with columnar islands located 39 cm from incisors s/p Bxs. Remaining esophageal mucosa was normal.    Stomach:   - Normal mucosa throughout stomach. No avms seen. No blood seen in stomach. Duodenum:   Pediatric colonoscope advanced from duodenal bulb to the 4th portion of duodenum. Normal folds seen throughout. No blood, no avms. Therapies:  See above    Specimen: Specimens were collected as described and send to the laboratory. Complications:   None were encountered during the procedure. EBL:  < 10 ml.           Recommendations:   -continue iron supplementation  -f/u with PCP      Igor Bae MD  1/26/2018  3:19 PM

## 2018-01-26 NOTE — PERIOP NOTES
1520 pt arrived via stretcher from OR procedure. Lying left lateral side and resting comfortably  1522 brought caregiver back to visit  32 [phone] 16 Pt. Alert. Denies pain or chill. Discharge instructions reviewed with caregiver and patient. Allowed and answered any and all questions. Tolerating PO fluids. Both state ready for discharge. Assisted pt with getting dressed.  Confirmed all belongings with patient

## 2018-01-26 NOTE — ANESTHESIA POSTPROCEDURE EVALUATION
Post-Anesthesia Evaluation and Assessment    Patient: Trudi Ruff MRN: [id number]  SSN: [id number]    YOB: 1945  Age: 81 y.o. Sex: female       Cardiovascular Function/Vital Signs  Visit Vitals    /81    Pulse 82    Temp 36.5 °C (97.7 °F)    Resp 15    Ht 5' 6\" (1.676 m)    Wt 74.8 kg (165 lb)    SpO2 99%    BMI 26.63 kg/m2       Patient is status post general, total IV anesthesia anesthesia for Procedure(s):  ESOPHAGOGASTRODUODENOSCOPY (EGD)  ESOPHAGOGASTRODUODENAL (EGD) BIOPSY. Nausea/Vomiting: None    Postoperative hydration reviewed and adequate. Pain:  Pain Scale 1: Numeric (0 - 10) (01/26/18 1525)  Pain Intensity 1: 0 (01/26/18 1525)   Managed    Neurological Status:   Neuro (WDL): Within Defined Limits (01/26/18 1525)  Neuro  Neurologic State: Alert;Eyes open spontaneously (01/26/18 1525)  LUE Motor Response: Purposeful;Spontaneous  (01/26/18 1525)  LLE Motor Response: Purposeful;Spontaneous  (01/26/18 1525)  RUE Motor Response: Purposeful;Spontaneous  (01/26/18 1525)  RLE Motor Response: Purposeful;Spontaneous  (01/26/18 1525)   At baseline    Mental Status and Level of Consciousness: Arousable    Pulmonary Status:   O2 Device: Room air (01/26/18 1525)   Adequate oxygenation and airway patent    Complications related to anesthesia: None    Post-anesthesia assessment completed.  No concerns    Signed By: Amalia Thomas MD     January 26, 2018

## 2018-09-27 RX ORDER — PAROXETINE HYDROCHLORIDE 20 MG/1
TABLET, FILM COATED ORAL
Qty: 90 TAB | Refills: 3 | Status: SHIPPED | OUTPATIENT
Start: 2018-09-27 | End: 2019-09-17 | Stop reason: SDUPTHER

## 2018-12-07 ENCOUNTER — HOSPITAL ENCOUNTER (INPATIENT)
Age: 73
LOS: 3 days | Discharge: HOME | DRG: 378 | End: 2018-12-10
Attending: EMERGENCY MEDICINE | Admitting: INTERNAL MEDICINE
Payer: MEDICARE

## 2018-12-07 DIAGNOSIS — D62 ACUTE BLOOD LOSS ANEMIA: Primary | ICD-10-CM

## 2018-12-07 PROBLEM — D64.9 SYMPTOMATIC ANEMIA: Status: ACTIVE | Noted: 2018-12-07

## 2018-12-07 LAB
ALBUMIN SERPL-MCNC: 4 G/DL (ref 3.5–5)
ALBUMIN/GLOB SERPL: 1.2 {RATIO} (ref 1.1–2.2)
ALP SERPL-CCNC: 57 U/L (ref 45–117)
ALT SERPL-CCNC: 18 U/L (ref 12–78)
ANION GAP SERPL CALC-SCNC: 6 MMOL/L (ref 5–15)
AST SERPL-CCNC: 19 U/L (ref 15–37)
BASOPHILS # BLD: 0 K/UL (ref 0–0.1)
BASOPHILS NFR BLD: 1 % (ref 0–1)
BILIRUB SERPL-MCNC: 0.6 MG/DL (ref 0.2–1)
BUN SERPL-MCNC: 17 MG/DL (ref 6–20)
BUN/CREAT SERPL: 23 (ref 12–20)
CALCIUM SERPL-MCNC: 8.5 MG/DL (ref 8.5–10.1)
CHLORIDE SERPL-SCNC: 107 MMOL/L (ref 97–108)
CO2 SERPL-SCNC: 25 MMOL/L (ref 21–32)
CREAT SERPL-MCNC: 0.73 MG/DL (ref 0.55–1.02)
DIFFERENTIAL METHOD BLD: ABNORMAL
EOSINOPHIL # BLD: 0.1 K/UL (ref 0–0.4)
EOSINOPHIL NFR BLD: 3 % (ref 0–7)
ERYTHROCYTE [DISTWIDTH] IN BLOOD BY AUTOMATED COUNT: 18.6 % (ref 11.5–14.5)
GLOBULIN SER CALC-MCNC: 3.4 G/DL (ref 2–4)
GLUCOSE SERPL-MCNC: 97 MG/DL (ref 65–100)
HCT VFR BLD AUTO: 21.3 % (ref 35–47)
HGB BLD-MCNC: 5.5 G/DL (ref 11.5–16)
IMM GRANULOCYTES # BLD: 0 K/UL (ref 0–0.04)
IMM GRANULOCYTES NFR BLD AUTO: 0 % (ref 0–0.5)
LYMPHOCYTES # BLD: 1.1 K/UL (ref 0.8–3.5)
LYMPHOCYTES NFR BLD: 33 % (ref 12–49)
MCH RBC QN AUTO: 17 PG (ref 26–34)
MCHC RBC AUTO-ENTMCNC: 25.8 G/DL (ref 30–36.5)
MCV RBC AUTO: 65.7 FL (ref 80–99)
MONOCYTES # BLD: 0.3 K/UL (ref 0–1)
MONOCYTES NFR BLD: 10 % (ref 5–13)
NEUTS SEG # BLD: 1.7 K/UL (ref 1.8–8)
NEUTS SEG NFR BLD: 53 % (ref 32–75)
NRBC # BLD: 0 K/UL (ref 0–0.01)
NRBC BLD-RTO: 0 PER 100 WBC
PLATELET # BLD AUTO: 252 K/UL (ref 150–400)
PMV BLD AUTO: 9.8 FL (ref 8.9–12.9)
POTASSIUM SERPL-SCNC: 3.9 MMOL/L (ref 3.5–5.1)
PROT SERPL-MCNC: 7.4 G/DL (ref 6.4–8.2)
RBC # BLD AUTO: 3.24 M/UL (ref 3.8–5.2)
RBC MORPH BLD: ABNORMAL
SODIUM SERPL-SCNC: 138 MMOL/L (ref 136–145)
WBC # BLD AUTO: 3.2 K/UL (ref 3.6–11)

## 2018-12-07 PROCEDURE — 30233N1 TRANSFUSION OF NONAUTOLOGOUS RED BLOOD CELLS INTO PERIPHERAL VEIN, PERCUTANEOUS APPROACH: ICD-10-PCS

## 2018-12-07 PROCEDURE — C9113 INJ PANTOPRAZOLE SODIUM, VIA: HCPCS | Performed by: EMERGENCY MEDICINE

## 2018-12-07 PROCEDURE — 36415 COLL VENOUS BLD VENIPUNCTURE: CPT

## 2018-12-07 PROCEDURE — 86902 BLOOD TYPE ANTIGEN DONOR EA: CPT

## 2018-12-07 PROCEDURE — 86905 BLOOD TYPING RBC ANTIGENS: CPT

## 2018-12-07 PROCEDURE — 85014 HEMATOCRIT: CPT

## 2018-12-07 PROCEDURE — 86922 COMPATIBILITY TEST ANTIGLOB: CPT

## 2018-12-07 PROCEDURE — 77030010306 HC SYS DEL CAP STRC -C: Performed by: SPECIALIST

## 2018-12-07 PROCEDURE — 93005 ELECTROCARDIOGRAM TRACING: CPT

## 2018-12-07 PROCEDURE — 99284 EMERGENCY DEPT VISIT MOD MDM: CPT

## 2018-12-07 PROCEDURE — 80053 COMPREHEN METABOLIC PANEL: CPT

## 2018-12-07 PROCEDURE — 96374 THER/PROPH/DIAG INJ IV PUSH: CPT

## 2018-12-07 PROCEDURE — 85025 COMPLETE CBC W/AUTO DIFF WBC: CPT

## 2018-12-07 PROCEDURE — 76040000019: Performed by: SPECIALIST

## 2018-12-07 PROCEDURE — 0DJ07ZZ INSPECTION OF UPPER INTESTINAL TRACT, VIA NATURAL OR ARTIFICIAL OPENING: ICD-10-PCS | Performed by: SPECIALIST

## 2018-12-07 PROCEDURE — 36430 TRANSFUSION BLD/BLD COMPNT: CPT

## 2018-12-07 PROCEDURE — P9016 RBC LEUKOCYTES REDUCED: HCPCS

## 2018-12-07 PROCEDURE — 74011250636 HC RX REV CODE- 250/636: Performed by: EMERGENCY MEDICINE

## 2018-12-07 PROCEDURE — 86870 RBC ANTIBODY IDENTIFICATION: CPT

## 2018-12-07 PROCEDURE — 77030018766 HC KT ENDOSC IMAG GVIM -F: Performed by: SPECIALIST

## 2018-12-07 PROCEDURE — 65270000015 HC RM PRIVATE ONCOLOGY

## 2018-12-07 PROCEDURE — 74011000258 HC RX REV CODE- 258: Performed by: INTERNAL MEDICINE

## 2018-12-07 PROCEDURE — 86900 BLOOD TYPING SEROLOGIC ABO: CPT

## 2018-12-07 PROCEDURE — 86921 COMPATIBILITY TEST INCUBATE: CPT

## 2018-12-07 PROCEDURE — 86920 COMPATIBILITY TEST SPIN: CPT

## 2018-12-07 RX ORDER — MECLIZINE HYDROCHLORIDE 25 MG/1
25 TABLET ORAL DAILY
Status: DISCONTINUED | OUTPATIENT
Start: 2018-12-08 | End: 2018-12-10 | Stop reason: HOSPADM

## 2018-12-07 RX ORDER — ONDANSETRON 2 MG/ML
4 INJECTION INTRAMUSCULAR; INTRAVENOUS
Status: DISCONTINUED | OUTPATIENT
Start: 2018-12-07 | End: 2018-12-10 | Stop reason: HOSPADM

## 2018-12-07 RX ORDER — DIAZEPAM 5 MG/1
5 TABLET ORAL
Status: DISCONTINUED | OUTPATIENT
Start: 2018-12-07 | End: 2018-12-10 | Stop reason: HOSPADM

## 2018-12-07 RX ORDER — DEXTROSE MONOHYDRATE AND SODIUM CHLORIDE 5; .9 G/100ML; G/100ML
75 INJECTION, SOLUTION INTRAVENOUS CONTINUOUS
Status: DISCONTINUED | OUTPATIENT
Start: 2018-12-07 | End: 2018-12-09

## 2018-12-07 RX ORDER — SODIUM CHLORIDE 9 MG/ML
250 INJECTION, SOLUTION INTRAVENOUS AS NEEDED
Status: DISCONTINUED | OUTPATIENT
Start: 2018-12-07 | End: 2018-12-10 | Stop reason: HOSPADM

## 2018-12-07 RX ORDER — SODIUM CHLORIDE 0.9 % (FLUSH) 0.9 %
5-10 SYRINGE (ML) INJECTION EVERY 8 HOURS
Status: DISCONTINUED | OUTPATIENT
Start: 2018-12-07 | End: 2018-12-10 | Stop reason: HOSPADM

## 2018-12-07 RX ORDER — PAROXETINE HYDROCHLORIDE 20 MG/1
20 TABLET, FILM COATED ORAL DAILY
Status: DISCONTINUED | OUTPATIENT
Start: 2018-12-08 | End: 2018-12-10 | Stop reason: HOSPADM

## 2018-12-07 RX ORDER — PANTOPRAZOLE SODIUM 40 MG/10ML
40 INJECTION, POWDER, LYOPHILIZED, FOR SOLUTION INTRAVENOUS
Status: COMPLETED | OUTPATIENT
Start: 2018-12-07 | End: 2018-12-07

## 2018-12-07 RX ORDER — THERA TABS 400 MCG
1 TAB ORAL DAILY
Status: DISCONTINUED | OUTPATIENT
Start: 2018-12-08 | End: 2018-12-10 | Stop reason: HOSPADM

## 2018-12-07 RX ORDER — SODIUM CHLORIDE 0.9 % (FLUSH) 0.9 %
5-10 SYRINGE (ML) INJECTION AS NEEDED
Status: DISCONTINUED | OUTPATIENT
Start: 2018-12-07 | End: 2018-12-10 | Stop reason: HOSPADM

## 2018-12-07 RX ADMIN — Medication 10 ML: at 23:21

## 2018-12-07 RX ADMIN — DEXTROSE MONOHYDRATE AND SODIUM CHLORIDE 75 ML/HR: 5; .9 INJECTION, SOLUTION INTRAVENOUS at 17:56

## 2018-12-07 RX ADMIN — Medication 10 ML: at 17:57

## 2018-12-07 RX ADMIN — PANTOPRAZOLE SODIUM 40 MG: 40 INJECTION, POWDER, FOR SOLUTION INTRAVENOUS at 14:23

## 2018-12-07 NOTE — PROGRESS NOTES
Oncology Nursing Communication Tool 6:10 PM 
12/7/2018 Bedside shift change report given to Basilio Wilson RN (incoming nurse) by Zayra Hadley (outgoing nurse) on Julisa Solorzano. Report included the following information SBAR. Shift Summary: Pt arrived from the ED displaying no signs of distress or discomfort. She was scheduled to do a Pill Cam Swallow that she tolerated without difficulty. She is NPO and this current moment, however may have clear liquids from 1930 until midnight as she may have a procedure done in the morning. One unit of packaged red blood cells have been ordered for pt and we are awaiting for blood bank to call that it is ready for . All consent forms have been signed. Issues for physician to address: Oncology Shift Note Admission Date 12/7/2018 Admission Diagnosis Acute blood loss anemia GI bleed Symptomatic anemia Code Status Full Code Consults IP CONSULT TO GASTROENTEROLOGY Cardiac Monitoring [] Yes [] No  
  
Purposeful Hourly Rounding [] Yes   
Anjali Score Total Score: 0 Anjali score 3 or > [] Bed Alarm [] Avasys [] 1:1 sitter [] Patient refused (Place signed refusal form in chart) Pain Managed [] Yes [] No  
 Key Pain Meds   
    
  
 ibuprofen (MOTRIN) 600 mg tablet Take 1 Tab by mouth every six (6) hours as needed for Pain. Influenza Vaccine Received Flu Vaccine for Current Season (usually Sept-March): Yes Oxygen needs? [] Room air Oxygen @  []1L    []2L    []3L   []4L    []5L   []6L Use home O2? [] Yes [] No 
Perform O2 challenge test using  smartphrase (.oxygenchallenge) Last bowel movement   
bowel movement Urinary Catheter LDAs Peripheral IV 12/07/18 Right Antecubital (Active) Site Assessment Clean, dry, & intact 12/7/2018  3:29 PM  
Phlebitis Assessment 0 12/7/2018  3:29 PM  
Infiltration Assessment 0 12/7/2018  3:29 PM  
 Dressing Status Clean, dry, & intact 12/7/2018  3:29 PM  
Dressing Type Transparent;Tape 12/7/2018  3:29 PM  
Hub Color/Line Status Blue 12/7/2018  3:29 PM  
                  
  
Readmission Risk Assessment Tool Score Low Risk 12 Total Score 3 Has Seen PCP in Last 6 Months (Yes=3, No=0)  
 5 Pt. Coverage (Medicare=5 , Medicaid, or Self-Pay=4) 4 Charlson Comorbidity Score (Age + Comorbid Conditions) Criteria that do not apply:  
 . Living with Significant Other. Assisted Living. LTAC. SNF. or  
Rehab Patient Length of Stay (>5 days = 3) IP Visits Last 12 Months (1-3=4, 4=9, >4=11) Expected Length of Stay - - - Actual Length of Stay 0 Reena Ramires

## 2018-12-07 NOTE — PROGRESS NOTES
Pill Cam Progress Note Davis Sharpe 1945 
1115/01 Date of Procedure: 12/7/2018 Outcome of Pill Cam Swallow: tolerated without difficulty Condition of patient after procedure: A&O X4, comfortable no sign of distress Belt was applied by: Julisa Bernard RN, Tiffany Zhao RN 
12/07/18

## 2018-12-07 NOTE — ED NOTES
TRANSFER - OUT REPORT: 
 
Verbal report given to Reggie Alberto RN (name) on Magdiel Lira  being transferred to Oncology (unit) for routine progression of care Report consisted of patients Situation, Background, Assessment and  
Recommendations(SBAR). Information from the following report(s) SBAR, Kardex, ED Summary, Intake/Output, MAR, Recent Results and Med Rec Status was reviewed with the receiving nurse. Lines:  
Peripheral IV 12/07/18 Right Antecubital (Active) Site Assessment Clean, dry, & intact 12/7/2018  1:34 PM  
Phlebitis Assessment 0 12/7/2018  1:34 PM  
Infiltration Assessment 0 12/7/2018  1:34 PM  
Dressing Status Clean, dry, & intact 12/7/2018  1:34 PM  
  
 
Opportunity for questions and clarification was provided. Patient transported with: 
 [employer]

## 2018-12-07 NOTE — CONSULTS
Gastroenterology Consultation Note  Dr. Kahlil Diaz    NAME: Patti Gill : 1945 MRN: [id number]   PCP: Carrillo Tes MD  Date/Time:  2018 3:27 PM  Subjective:   REASON FOR CONSULT:      Alberto Sorensen is a 72 y.o.  female who I was asked to see for GI blood loss anemia. 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 She had been c/o worsening SOB over the past week or two with severe fatigue. Not taking iron any longer since her Hgb in May was 14. Hgb 5.5. She did have a black stool or two about a week ago but since then has been normal in color. No abdominal pain. Has been nauseated and vomited x 1 over past week but no black vomitus. EGD with push enteroscopy 18: NEG to 4th portion duodenum    EGD 2016: NEG    COLONOSCOPY 2016:    1.  Scope advanced to the cecum and terminal ileum. 2.  Preparation was adequate. 3.  No polyps seen. 4.  Scattered wide mouthed diverticulosis in descending colon. 5.  Grade 1 internal hemorrhoids with skin tag. EGD  16:    ++ There was several flat red spots c/w AVMs in the second portion of the duodenum. Total of 4 seen and we cauterized them with BICAP on 15 blend then placed one hemoclip on most distal AVM. No active bleeding encountered. SB PILL CAM  16:    FINDINGS: Greater than 10 AVMs of the small bowel seen in the proximal one  half of the small bowel, none of which were bleeding. EGD and Colonoscopy 10/24/12 Dr. Pio Salinas [place]:    1. Duodenal AVMs  2. Questionable Short Segment Barretts  3. Prominent Gastric Folds  4. Gastric Polyps  5. Biopsies  6. Sigmoid Diverticulosis  7.  Rectal Polyp    Past Medical History:   Diagnosis Date    GERD (gastroesophageal reflux disease)     GI bleed     Duodenal AVMs    Ill-defined condition     hepatitis C    Ill-defined condition     small gall stones    Irregular heart beat     evaluated and thought to be panic attacks    Nausea & vomiting     Psychiatric disorder     anxiety      Past Surgical History:   Procedure Laterality Date    BREAST SURGERY PROCEDURE UNLISTED      lump removed - benign lt    COLONOSCOPY N/A 2016    COLONOSCOPY performed by Kiki Oates MD at [facility] ENDOSCOPY    HX BREAST BIOPSY Left 1994    HX ENDOSCOPY      HX HYSTERECTOMY      total    HX ORTHOPAEDIC      bone fusion in neck    HX OTHER SURGICAL      colonoscopy x 1-2 - ?one polyp/EGD    HX OTHER SURGICAL      bone removed from hip to place in neck     Social History     Tobacco Use    Smoking status: Former Smoker     Packs/day: 1.00     Years: 30.00     Pack years: 30.00     Last attempt to quit: 1994     Years since quittin.6    Smokeless tobacco: Never Used    Tobacco comment: former cigarette smoker   Substance Use Topics    Alcohol use: Yes     Alcohol/week: 3.6 oz     Types: 6 Cans of beer per week      Family History   Problem Relation Age of Onset    Liver Disease Father         cirrhosis    Emphysema Father     Other Father         hardening of arteries    Diabetes Sister     Heart Disease Brother     Cancer Brother         prostate      Allergies   Allergen Reactions    Ciprofloxacin Other (comments)     Hand pain    Methylprednisolone Other (comments)     Dizziness and felt disoriented    Naprosyn [Naproxen] Vertigo     Unsure of reaction.  Prevacid [Lansoprazole] Other (comments)     Ulcers in mouth.  Sulfa (Sulfonamide Antibiotics) Unknown (comments)     As child. Unsure of reaction.  Tegretol [Carbamazepine] Hives      Home Medications:  Prior to Admission Medications   Prescriptions Last Dose Informant Patient Reported? Taking? PARoxetine (PAXIL) 20 mg tablet   No No   Sig: TAKE 1 TABLET EVERY DAY   diazepam (VALIUM) 5 mg tablet   No No   Sig: Take 1 Tab by mouth every six (6) hours as needed for Anxiety. ferrous sulfate 325 mg (65 mg iron) tablet   Yes No   Sig: Take  by mouth Daily (before breakfast). ibuprofen (MOTRIN) 600 mg tablet   No No   Sig: Take 1 Tab by mouth every six (6) hours as needed for Pain. meclizine (ANTIVERT) 25 mg tablet   Yes No   Sig: Take 25 mg by mouth daily. minoxidil (ROGAINE) 2 % external solution   Yes No   Sig: Apply  to affected area daily. ondansetron (ZOFRAN ODT) 4 mg disintegrating tablet   No No   Sig: Take 1 Tab by mouth every eight (8) hours as needed for Nausea. pantoprazole (PROTONIX) 20 mg tablet   No No   Sig: TAKE 1 TABLET EVERY DAY   therapeutic multivitamin (THERAGRAN) tablet   No No   Sig: Take 1 Tab by mouth daily.       Facility-Administered Medications: None     Hospital medications:  Current Facility-Administered Medications   Medication Dose Route Frequency    0.9% sodium chloride infusion 250 mL  250 mL IntraVENous PRN    diazePAM (VALIUM) tablet 5 mg  5 mg Oral Q6H PRN    [START ON 12/8/2018] meclizine (ANTIVERT) tablet 25 mg  25 mg Oral DAILY    [START ON 12/8/2018] PARoxetine (PAXIL) tablet 20 mg  20 mg Oral DAILY    [START ON 12/8/2018] therapeutic multivitamin (THERAGRAN) tablet 1 Tab  1 Tab Oral DAILY    sodium chloride (NS) flush 5-10 mL  5-10 mL IntraVENous Q8H    sodium chloride (NS) flush 5-10 mL  5-10 mL IntraVENous PRN    ondansetron (ZOFRAN) injection 4 mg  4 mg IntraVENous Q4H PRN    dextrose 5% and 0.9% NaCl infusion  75 mL/hr IntraVENous CONTINUOUS    [START ON 12/8/2018] pantoprazole (PROTONIX) 40 mg in sodium chloride 0.9% 10 mL injection  40 mg IntraVENous Q12H     REVIEW OF SYSTEMS:      Review of Systems -   History obtained from chart review and the patient  General ROS: positive for  - fatigue  Psychological ROS: negative  Hematological and Lymphatic ROS: positive for - bleeding problems and blood transfusions  Respiratory ROS: no cough, shortness of breath, or wheezing  Cardiovascular ROS: no chest pain or dyspnea on exertion  Gastrointestinal ROS: see HPI  Genito-Urinary ROS: no dysuria, trouble voiding, or hematuria  Musculoskeletal ROS: negative  Neurological ROS: no TIA or stroke symptoms  Dermatological ROS: negative    Objective:   VITALS:    Visit Vitals  /64   Pulse 91   Temp 98 °F (36.7 °C)   Resp 22   Ht 5' 6\" (1.676 m)   Wt 72.3 kg (159 lb 6.3 oz)   SpO2 96%   BMI 25.73 kg/m²     Temp (24hrs), Av °F (36.7 °C), Min:98 °F (36.7 °C), Max:98 °F (36.7 °C)    PHYSICAL EXAM:   General:    Alert, cooperative, no distress, appears stated age. Head:   Normocephalic, without obvious abnormality, atraumatic. Eyes:   Conjunctivae clear, anicteric sclerae. Pupils are equal  Nose:  Nares normal. No drainage or sinus tenderness. Throat:    Lips, mucosa, and tongue normal.  No Thrush  Neck:  Supple, symmetrical,  no adenopathy, thyroid: non tender  Back:    Symmetric,  No CVA tenderness. Lungs:   CTA bilaterally. No wheezing/rhonchi/rales. Heart:   Regular rate and rhythm,  no murmur, rub or gallop. Abdomen:   Soft, nontender, nondistended,  BS present, no HSM  Rectal:  Per ER: no stool in vault to test  Extremities: No cyanosis. No edema. No clubbing  Skin:     Texture, turgor normal. No rashes/lesions/jaundice  Lymph:  Cervical, supraclavicular normal.  Psych:  Good insight. Not depressed. Not anxious or agitated. Neurologic: No facial asymmetry. No aphasia or slurred speech normal strength, A/O X 3. LAB DATA REVIEWED:    Lab Results   Component Value Date/Time    WBC 3.2 (L) 2018 01:33 PM    HGB 5.5 (LL) 2018 01:33 PM    HCT 21.3 (L) 2018 01:33 PM    PLATELET 909  01:33 PM    MCV 65.7 (L) 2018 01:33 PM     Lab Results   Component Value Date/Time    ALT (SGPT) 18 2018 01:33 PM    AST (SGOT) 19 2018 01:33 PM    Alk.  phosphatase 57 2018 01:33 PM    Bilirubin, direct 0.13 2017 09:05 AM    Bilirubin, total 0.6 2018 01:33 PM     Lab Results   Component Value Date/Time    Sodium 138 2018 01:33 PM    Potassium 3.9 2018 01:33 PM Chloride 107 12/07/2018 01:33 PM    CO2 25 12/07/2018 01:33 PM    Anion gap 6 12/07/2018 01:33 PM    Glucose 97 12/07/2018 01:33 PM    BUN 17 12/07/2018 01:33 PM    Creatinine 0.73 12/07/2018 01:33 PM    BUN/Creatinine ratio 23 (H) 12/07/2018 01:33 PM    GFR est AA >60 12/07/2018 01:33 PM    GFR est non-AA >60 12/07/2018 01:33 PM    Calcium 8.5 12/07/2018 01:33 PM     Lab Results   Component Value Date/Time    Lipase 257 05/27/2016 11:38 AM     Lab Results   Component Value Date/Time    INR 1.0 05/27/2016 11:38 AM    INR 1.0 01/24/2016 05:10 PM    Prothrombin time 10.1 05/27/2016 11:38 AM    Prothrombin time 10.4 01/24/2016 05:10 PM     Impression:    Acute blood loss anemia with h/o Duodenal AVMs/ Small Bowel AVMs: 1/2016, Hgb dropped from 13 to 5.9 during a bleed. Fatigue   SOB    Plan:  Patient with h/o chronic intermittent GI bleeding secondary to AVMs of the small bowel found on EGD/Enteroscopy and Pill cam in the past.  She had a Hgb 14 in May and now is down to 5.5 off iron replacement. Suspect she had chronic blood loss that recently accelerated a week ago when she had the black stool. She will need resuscitation with blood transfusion prior to attempting upper endoscopy/push enteroscopy.   We cannot pass the 4th portion of the duodenum so may have more distal avms that could be bleeding and for this would need a Single balloon enteroscopy but only done at [facility].  -for now transfuse 2 units of PRBCs tonight and follow serial H/H  -will keep NPO after MN  -can do Pill Cam now to increase yield of finding the bleeding site if we perform this closer to the bleeding episode          ___________________________________________________  Care Plan discussed with:    [x]    Patient   []    Family   [x]    Nursing   []    Attending   ___________________________________________________  GI: Guillaume Manning MD

## 2018-12-07 NOTE — H&P
Hospitalist Admission NoteNAME: Yanet Lira :  1945 MRN:  [id number] Date/Time:  2018 2:48 PM 
 
Patient PCP: Willis Yang MD GI= Dr Justyna Pleitez 
______________________________________________________________________ Given the patient's current clinical presentation, I have a high level of concern for decompensation if discharged from the emergency department. Complex decision making was performed, which includes reviewing the patient's available past medical records, laboratory results, and x-ray films. My assessment of this patient's clinical condition and my plan of care is as follows. Assessment / Plan: 
Acute Blood loss anemia POA Causing Symptomatic Anemia POA Due to Likely upper GI Bleed POA H/o Gastric AVMx Hb= 5.5 H/o melena x 1 week ago Admit to medical bed Transfuse 1 unit PRBC for now, check H/H post transfusion & cont Q 8 hrs for now Transfuse 1 more unit tonight if Hb <7.0 after 1 unit- RN instructed NPO except meds for now, IVF 
IV protonix BID for now Check stool for occult blood IP GI consulted- ?EGD plans if recommended CBC in AM 
Avoid NSAIDs - discussed with pt 
 
H/o Vertigo- refractory Cont meclizine daily per pt's request 
 
Anxiety disorder Cont home psych meds Code Status: Full Surrogate Decision Maker: Mariajose De # [phone] DVT Prophylaxis: SCDs GI Prophylaxis: PPI as above Baseline: Pt lives alone, independent with ADLs Subjective: CHIEF COMPLAINT: Dizziness, weakness, fatigue x 1 week HISTORY OF PRESENT ILLNESS:    
Dearafshin Manning is a 72 y.o.  female who presents with above complains from home ambulatory. Pt presents with CC of extreme fatigue , tiredness, x 1 week H/o associated abdominal sharp pain x 1 day H/o melena x 1 week back for a week- claims now it has stopped H/o Gastric AVMs in past with episode of GI bleeding requring EGD/cautrization by dr Joi Clay in past 
 Pt denies any chest pain, palpitations, syncope Pt was found to have Hb 5.5 in ER on workup with stool guaiac pending . We were asked to admit for work up and evaluation of the above problems. Past Medical History:  
Diagnosis Date  GERD (gastroesophageal reflux disease)  GI bleed Duodenal AVMs  Ill-defined condition   
 hepatitis C  
 Ill-defined condition   
 small gall stones  Irregular heart beat   
 evaluated and thought to be panic attacks  Nausea & vomiting  Psychiatric disorder   
 anxiety Past Surgical History:  
Procedure Laterality Date  BREAST SURGERY PROCEDURE UNLISTED    
 lump removed - benign lt  COLONOSCOPY N/A 2016 COLONOSCOPY performed by Cristian Ann [facility] at [facility]  
 HX BREAST BIOPSY Left 1994  HX ENDOSCOPY    
 HX HYSTERECTOMY    
 total  
 HX ORTHOPAEDIC    
 bone fusion in neck  HX OTHER SURGICAL    
 colonoscopy x 1-2 - ?one polyp/EGD  HX OTHER SURGICAL    
 bone removed from hip to place in neck Social History Tobacco Use  Smoking status: Former Smoker Packs/day: 1.00 Years: 30.00 Pack years: 30.00 Last attempt to quit: 1994 Years since quittin.6  Smokeless tobacco: Never Used  Tobacco comment: former cigarette smoker Substance Use Topics  Alcohol use: Yes Alcohol/week: 3.6 oz Types: 6 Cans of beer per week Family History Problem Relation Age of Onset  Liver Disease Father   
     cirrhosis  Emphysema Father  Other Father   
     hardening of arteries  Diabetes Sister  Heart Disease Brother  Cancer Brother   
     prostate Allergies Allergen Reactions  Ciprofloxacin Other (comments) Hand pain  Methylprednisolone Other (comments) Dizziness and felt disoriented  Naprosyn [Naproxen] Vertigo Unsure of reaction.  Prevacid [Lansoprazole] Other (comments) Ulcers in mouth.  Sulfa (Sulfonamide Antibiotics) Unknown (comments) As child. Unsure of reaction.  Tegretol [Carbamazepine] Hives Prior to Admission medications Medication Sig Start Date End Date Taking? Authorizing Provider PARoxetine (PAXIL) 20 mg tablet TAKE 1 TABLET EVERY DAY 9/27/18   Tomas Barros MD  
meclizine (ANTIVERT) 25 mg tablet Take 25 mg by mouth daily. Provider, Historical  
pantoprazole (PROTONIX) 20 mg tablet TAKE 1 TABLET EVERY DAY 2/23/17   Raymond Zimmer MD  
ibuprofen (MOTRIN) 600 mg tablet Take 1 Tab by mouth every six (6) hours as needed for Pain. 10/21/16   May Alexander MD  
ferrous sulfate 325 mg (65 mg iron) tablet Take  by mouth Daily (before breakfast). Krish, MD Roma  
ondansetron (ZOFRAN ODT) 4 mg disintegrating tablet Take 1 Tab by mouth every eight (8) hours as needed for Nausea. 5/27/16   Ajit Han MD  
therapeutic multivitamin [facility]) tablet Take 1 Tab by mouth daily. 1/26/16   Viv Tsai MD  
diazepam (VALIUM) 5 mg tablet Take 1 Tab by mouth every six (6) hours as needed for Anxiety. 9/14/12   Reji Pressley MD  
minoxidil (ROGAINE) 2 % external solution Apply  to affected area daily. Provider, Historical  
 
 
REVIEW OF SYSTEMS:    
 
 
Total of 12 systems reviewed as follows:   
   POSITIVE= underlined text  Negative = text not underlined General:  fever, chills, sweats, generalized weakness, weight loss/gain,  
   loss of appetite Eyes:    blurred vision, eye pain, loss of vision, double vision ENT:    rhinorrhea, pharyngitis Respiratory:   cough, sputum production, SOB, [place], wheezing, pleuritic pain  
Cardiology:   chest pain, palpitations, orthopnea, PND, edema, syncope Gastrointestinal:  abdominal pain , N/V, diarrhea, dysphagia, constipation, bleeding Genitourinary:  frequency, urgency, dysuria, hematuria, incontinence Muskuloskeletal :  arthralgia, myalgia, back pain Hematology:  easy bruising, nose or gum bleeding, lymphadenopathy Dermatological: rash, ulceration, pruritis, color change / jaundice Endocrine:   hot flashes or polydipsia Neurological:  headache, dizziness, confusion, focal weakness, paresthesia, Speech difficulties, memory loss, gait difficulty Psychological: Feelings of anxiety, depression, agitation Objective: VITALS:   
Visit Vitals /64 Pulse 91 Temp 98 °F (36.7 °C) Resp 22 Ht 5' 6\" (1.676 m) Wt 72.3 kg (159 lb 6.3 oz) SpO2 96% BMI 25.73 kg/m² PHYSICAL EXAM: 
 
General:    Alert, cooperative, no distress, appears stated age. HEENT: Atraumatic, anicteric sclerae, pale conjunctivae noted + No oral ulcers, mucosa moist, throat clear, dentition fair Neck:  Supple, symmetrical,  thyroid: non tender Lungs:   Clear to auscultation bilaterally. No Wheezing or Rhonchi. No rales. Chest wall:  No tenderness  No Accessory muscle use. Heart:   Regular  rhythm,  No  murmur   No edema Abdomen:   Soft, non-tender. Not distended. Bowel sounds normal 
Extremities: No cyanosis. No clubbing,   
  Skin turgor normal, Capillary refill normal, Radial dial pulse 2+ Skin:     Not pale. Not Jaundiced  No rashes Psych:  Good insight. Not depressed. Not anxious or agitated. Neurologic: EOMs intact. No facial asymmetry. No aphasia or slurred speech. Symmetrical strength, Sensation grossly intact. Alert and oriented X 4.  
 
_______________________________________________________________________ Care Plan discussed with: 
  Comments Patient x Family RN x Care Manager Consultant:     
_______________________________________________________________________ Expected  Disposition:  
Home with Family x HH/PT/OT/RN   
SNF/LTC   
MATA   
________________________________________________________________________ TOTAL TIME:  51 Minutes Critical Care Provided     Minutes non procedure based Comments  
 x Reviewed previous records  
>50% of visit spent in counseling and coordination of care x Discussion with patient and questions answered 
  
 
________________________________________________________________________ Signed: Carrillo Arambula MD 
 
Procedures: see electronic medical records for all procedures/Xrays and details which were not copied into this note but were reviewed prior to creation of Plan. LAB DATA REVIEWED:   
Recent Results (from the past 24 hour(s)) CBC WITH AUTOMATED DIFF Collection Time: 12/07/18  1:33 PM  
Result Value Ref Range WBC 3.2 (L) 3.6 - 11.0 K/uL  
 RBC 3.24 (L) 3.80 - 5.20 M/uL HGB 5.5 (LL) 11.5 - 16.0 g/dL HCT 21.3 (L) 35.0 - 47.0 % MCV 65.7 (L) 80.0 - 99.0 FL  
 MCH 17.0 (L) 26.0 - 34.0 PG  
 MCHC 25.8 (L) 30.0 - 36.5 g/dL  
 RDW 18.6 (H) 11.5 - 14.5 % PLATELET 579 332 - 644 K/uL MPV 9.8 8.9 - 12.9 FL  
 NRBC 0.0 0  WBC ABSOLUTE NRBC 0.00 0.00 - 0.01 K/uL NEUTROPHILS 53 32 - 75 % LYMPHOCYTES 33 12 - 49 % MONOCYTES 10 5 - 13 % EOSINOPHILS 3 0 - 7 % BASOPHILS 1 0 - 1 % IMMATURE GRANULOCYTES 0 0.0 - 0.5 % ABS. NEUTROPHILS 1.7 (L) 1.8 - 8.0 K/UL  
 ABS. LYMPHOCYTES 1.1 0.8 - 3.5 K/UL  
 ABS. MONOCYTES 0.3 0.0 - 1.0 K/UL  
 ABS. EOSINOPHILS 0.1 0.0 - 0.4 K/UL  
 ABS. BASOPHILS 0.0 0.0 - 0.1 K/UL  
 ABS. IMM. GRANS. 0.0 0.00 - 0.04 K/UL  
 DF AUTOMATED    
 RBC COMMENTS MICROCYTOSIS 2+ 
    
 RBC COMMENTS OVALOCYTES 1+ RBC COMMENTS HYPOCHROMIA 2+ 
    
 RBC COMMENTS ANISOCYTOSIS 
1+ METABOLIC PANEL, COMPREHENSIVE Collection Time: 12/07/18  1:33 PM  
Result Value Ref Range Sodium 138 136 - 145 mmol/L Potassium 3.9 3.5 - 5.1 mmol/L Chloride 107 97 - 108 mmol/L  
 CO2 25 21 - 32 mmol/L Anion gap 6 5 - 15 mmol/L Glucose 97 65 - 100 mg/dL BUN 17 6 - 20 MG/DL Creatinine 0.73 0.55 - 1.02 MG/DL  
 BUN/Creatinine ratio 23 (H) 12 - 20 GFR est AA >60 >60 ml/min/1.73m2 GFR est non-AA >60 >60 ml/min/1.73m2 Calcium 8.5 8.5 - 10.1 MG/DL Bilirubin, total 0.6 0.2 - 1.0 MG/DL  
 ALT (SGPT) 18 12 - 78 U/L  
 AST (SGOT) 19 15 - 37 U/L Alk. phosphatase 57 45 - 117 U/L Protein, total 7.4 6.4 - 8.2 g/dL Albumin 4.0 3.5 - 5.0 g/dL Globulin 3.4 2.0 - 4.0 g/dL A-G Ratio 1.2 1.1 - 2.2 TYPE & SCREEN Collection Time: 12/07/18  1:33 PM  
Result Value Ref Range Crossmatch Expiration 12/10/2018 ABO/Rh(D) O POSITIVE Antibody screen PENDING   
EKG, 12 LEAD, INITIAL Collection Time: 12/07/18  1:41 PM  
Result Value Ref Range Ventricular Rate 73 BPM  
 Atrial Rate 73 BPM  
 P-R Interval 158 ms QRS Duration 72 ms Q-T Interval 404 ms QTC Calculation (Bezet) 445 ms Calculated P Axis 27 degrees Calculated R Axis -3 degrees Calculated T Axis 44 degrees Diagnosis Normal sinus rhythm Possible Left atrial enlargement When compared with ECG of 06-AUG-2016 09:00, 
Non-specific change in ST segment in Anterior leads

## 2018-12-07 NOTE — ED PROVIDER NOTES
EMERGENCY DEPARTMENT HISTORY AND PHYSICAL EXAM 
 
 
Date: 12/7/2018 Patient Name: Gabriele Rivera History of Presenting Illness Chief Complaint Patient presents with  Abnormal Lab Results Pt ambulatory to triage, states she was referred to PCP for lethargy; hgb was 6 she was told; has been transfused in the past; denies CP, SOB History Provided By: Patient HPI: Gabriele Rivera, 72 y.o. female with PMHx significant for GERD, anxiety, GI bleed, hepatitis C, presents ambulatory to the ED with cc of a low Hgb of 6. She reports associated symptoms of SOB, mild CP, dizziness, and BLE cramping x 2-3 weeks. She states her SOB is exacerbated with walking. Pt endorses having a similar episode in the past, noting Dr. Yumiko Travis had to cauterize varicose veins in her abdomen. She states she had last seen Dr. Yumiko Travis in January and he is aware she is coming in to the ED today. Pt was asked if she had any black stools and she reports she did x 1 week ago, but it had resolved. She also had experienced a \"stinging\" abdominal pain today, but this has since resolved. She denies any inciting factors and states it did not last long. She denies the use of an anti-coagulant. Pt denies any syncope, blood in her stool, nausea, or vomiting. There are no other complaints, changes, or physical findings at this time. PCP: Stacy Monroe MD 
 
Current Facility-Administered Medications Medication Dose Route Frequency Provider Last Rate Last Dose  
 0.9% sodium chloride infusion 250 mL  250 mL IntraVENous PRN Rick Rosas DO      
 pantoprazole (PROTONIX) injection 40 mg  40 mg IntraVENous NOW Rick Rosas DO      
 
Current Outpatient Medications Medication Sig Dispense Refill  PARoxetine (PAXIL) 20 mg tablet TAKE 1 TABLET EVERY DAY 90 Tab 3  
 meclizine (ANTIVERT) 25 mg tablet Take 25 mg by mouth daily.     
 pantoprazole (PROTONIX) 20 mg tablet TAKE 1 TABLET EVERY DAY 60 Tab 2  
  ibuprofen (MOTRIN) 600 mg tablet Take 1 Tab by mouth every six (6) hours as needed for Pain. 30 Tab 0  
 ferrous sulfate 325 mg (65 mg iron) tablet Take  by mouth Daily (before breakfast).  ondansetron (ZOFRAN ODT) 4 mg disintegrating tablet Take 1 Tab by mouth every eight (8) hours as needed for Nausea. 10 Tab 0  
 therapeutic multivitamin (THERAGRAN) tablet Take 1 Tab by mouth daily. 30 Tab 0  
 diazepam (VALIUM) 5 mg tablet Take 1 Tab by mouth every six (6) hours as needed for Anxiety. 30 Tab 0  
 minoxidil (ROGAINE) 2 % external solution Apply  to affected area daily. Past History Past Medical History: 
Past Medical History:  
Diagnosis Date  GERD (gastroesophageal reflux disease)  GI bleed Duodenal AVMs  Ill-defined condition   
 hepatitis C  
 Ill-defined condition   
 small gall stones  Irregular heart beat   
 evaluated and thought to be panic attacks  Nausea & vomiting  Psychiatric disorder   
 anxiety Past Surgical History: 
Past Surgical History:  
Procedure Laterality Date  BREAST SURGERY PROCEDURE UNLISTED    
 lump removed - benign lt  COLONOSCOPY N/A 8/24/2016 COLONOSCOPY performed by Padmini Montero MD at [facility] ENDOSCOPY  
 HX BREAST BIOPSY Left 01/01/1994  HX ENDOSCOPY    
 HX HYSTERECTOMY    
 total  
 HX ORTHOPAEDIC  1997  
 bone fusion in neck  HX OTHER SURGICAL    
 colonoscopy x 1-2 - ?one polyp/EGD  HX OTHER SURGICAL    
 bone removed from hip to place in neck Family History: 
Family History Problem Relation Age of Onset  Liver Disease Father   
     cirrhosis  Emphysema Father  Other Father   
     hardening of arteries  Diabetes Sister  Heart Disease Brother  Cancer Brother   
     prostate Social History: 
Social History Tobacco Use  Smoking status: Former Smoker Packs/day: 1.00 Years: 30.00 Pack years: 30.00 Last attempt to quit: 4/2/1994 Years since quittin.6  Smokeless tobacco: Never Used  Tobacco comment: former cigarette smoker Substance Use Topics  Alcohol use: Yes Alcohol/week: 3.6 oz Types: 6 Cans of beer per week  Drug use: No  
 
 
Allergies: Allergies Allergen Reactions  Ciprofloxacin Other (comments) Hand pain  Methylprednisolone Other (comments) Dizziness and felt disoriented  Naprosyn [Naproxen] Vertigo Unsure of reaction.  Prevacid [Lansoprazole] Other (comments) Ulcers in mouth.  Sulfa (Sulfonamide Antibiotics) Unknown (comments) As child. Unsure of reaction.  Tegretol [Carbamazepine] Hives Review of Systems Review of Systems Constitutional: Negative. Negative for appetite change, chills, fatigue and fever. HENT: Negative. Negative for congestion, rhinorrhea, sinus pressure and sore throat. Eyes: Negative. Respiratory: Positive for shortness of breath. Negative for cough, choking, chest tightness and wheezing. Cardiovascular: Positive for chest pain. Negative for palpitations and leg swelling. Gastrointestinal: Positive for abdominal pain. Negative for blood in stool, constipation, diarrhea, nausea and vomiting. Endocrine: Negative. Genitourinary: Negative. Negative for difficulty urinating, dysuria, flank pain and urgency. Musculoskeletal: Positive for myalgias (+BLE). Skin: Negative. Neurological: Positive for dizziness. Negative for syncope, speech difficulty, weakness, light-headedness, numbness and headaches. Psychiatric/Behavioral: Negative. All other systems reviewed and are negative. Physical Exam  
Physical Exam  
Constitutional: She is oriented to person, place, and time. She appears well-developed and well-nourished. No distress. HENT:  
Head: Normocephalic and atraumatic. Mouth/Throat: Oropharynx is clear and moist. No oropharyngeal exudate. Eyes: EOM are normal. Pupils are equal, round, and reactive to light. Pale conjunctiva Neck: Normal range of motion. Neck supple. No JVD present. No tracheal deviation present. Cardiovascular: Normal rate, regular rhythm, normal heart sounds and intact distal pulses. No murmur heard. Pulmonary/Chest: Effort normal and breath sounds normal. No stridor. No respiratory distress. She has no wheezes. She has no rales. She exhibits no tenderness. Abdominal: Soft. She exhibits no distension. There is no tenderness. There is no rebound and no guarding. Musculoskeletal: Normal range of motion. She exhibits no edema or tenderness. Neurological: She is alert and oriented to person, place, and time. No cranial nerve deficit. No gross motor or sensory deficits Skin: Skin is warm and dry. She is not diaphoretic. Psychiatric: She has a normal mood and affect. Her behavior is normal.  
Nursing note and vitals reviewed. Diagnostic Study Results Labs - Recent Results (from the past 12 hour(s)) CBC WITH AUTOMATED DIFF Collection Time: 12/07/18  1:33 PM  
Result Value Ref Range WBC 3.2 (L) 3.6 - 11.0 K/uL  
 RBC 3.24 (L) 3.80 - 5.20 M/uL HGB 5.5 (LL) 11.5 - 16.0 g/dL HCT 21.3 (L) 35.0 - 47.0 % MCV 65.7 (L) 80.0 - 99.0 FL  
 MCH 17.0 (L) 26.0 - 34.0 PG  
 MCHC 25.8 (L) 30.0 - 36.5 g/dL  
 RDW 18.6 (H) 11.5 - 14.5 % PLATELET 525 746 - 693 K/uL MPV 9.8 8.9 - 12.9 FL  
 NRBC 0.0 0  WBC ABSOLUTE NRBC 0.00 0.00 - 0.01 K/uL NEUTROPHILS PENDING % LYMPHOCYTES PENDING % MONOCYTES PENDING % EOSINOPHILS PENDING % BASOPHILS PENDING % IMMATURE GRANULOCYTES PENDING %  
 ABS. NEUTROPHILS PENDING K/UL  
 ABS. LYMPHOCYTES PENDING K/UL  
 ABS. MONOCYTES PENDING K/UL  
 ABS. EOSINOPHILS PENDING K/UL  
 ABS. BASOPHILS PENDING K/UL  
 ABS. IMM. GRANS. PENDING K/UL  
 DF PENDING   
METABOLIC PANEL, COMPREHENSIVE Collection Time: 12/07/18  1:33 PM  
Result Value Ref Range Sodium 138 136 - 145 mmol/L Potassium 3.9 3.5 - 5.1 mmol/L Chloride 107 97 - 108 mmol/L  
 CO2 25 21 - 32 mmol/L Anion gap 6 5 - 15 mmol/L Glucose 97 65 - 100 mg/dL BUN 17 6 - 20 MG/DL Creatinine 0.73 0.55 - 1.02 MG/DL  
 BUN/Creatinine ratio 23 (H) 12 - 20 GFR est AA >60 >60 ml/min/1.73m2 GFR est non-AA >60 >60 ml/min/1.73m2 Calcium 8.5 8.5 - 10.1 MG/DL Bilirubin, total 0.6 0.2 - 1.0 MG/DL  
 ALT (SGPT) 18 12 - 78 U/L  
 AST (SGOT) 19 15 - 37 U/L Alk. phosphatase 57 45 - 117 U/L Protein, total 7.4 6.4 - 8.2 g/dL Albumin 4.0 3.5 - 5.0 g/dL Globulin 3.4 2.0 - 4.0 g/dL A-G Ratio 1.2 1.1 - 2.2 Radiologic Studies - No orders to display Medical Decision Making I am the first provider for this patient. I reviewed the vital signs, available nursing notes, past medical history, past surgical history, family history and social history. Vital Signs-Reviewed the patient's vital signs. Patient Vitals for the past 12 hrs: 
 Temp Pulse Resp BP SpO2  
12/07/18 1333  83 17 145/64 100 % 12/07/18 1255 98 °F (36.7 °C) (!) 105 16 161/53 100 % Pulse Oximetry Analysis - 100% on RA Cardiac Monitor:  
Rate: 87 bpm 
Rhythm: Normal Sinus Rhythm EKG interpretation: (Preliminary) NSR, rate 73, normal axis/pr/qrs, no acute ST changes, Joslyn Carrero DO 
 
Records Reviewed: Nursing Notes, Old Medical Records, Previous Radiology Studies and Previous Laboratory Studies Provider Notes (Medical Decision Making): DDx: acute blood loss anemia ED Course:  
Initial assessment performed. The patients presenting problems have been discussed, and they are in agreement with the care plan formulated and outlined with them. I have encouraged them to ask questions as they arise throughout their visit. PROGRESS NOTE: 
2:01 PM 
Hgb is 5.5. CONSULT NOTE: 
2:00 PM 
Brad Joy DO spoke with Dr. Moraima Jackson, Specialty: GI 
 Discussed patient's hx, disposition, and available diagnostic and imaging results. Reviewed care plans. Consultant agrees with plans as outlined. Will see pt in ED. Critical Care Time: CRITICAL CARE NOTE : 
 
2:01 PM 
 
IMPENDING DETERIORATION -Cardiovascular ASSOCIATED RISK FACTORS - Bleeding and Anemia, Hgb <6 MANAGEMENT- Bedside Assessment and Supervision of Care INTERPRETATION -  Xrays, ECG, Blood Pressure, Cardiac Output Measures  and Labs INTERVENTIONS - hemodynamic mngmt CASE REVIEW - Hospitalist, Medical Sub-Specialist, Nursing and Family TREATMENT RESPONSE -Stable PERFORMED BY - Self NOTES   : 
 
I have spent 40 minutes of critical care time involved in lab review, consultations with specialist, family decision- making, bedside attention and documentation. During this entire length of time I was immediately available to the patient . Aracelis Bird DO Disposition: PLAN: 
1. Admit ADMIT NOTE: 
2:30 PM 
Patient is being admitted to the hospital by Dr. Kyle Ramirez. The results of their tests and reasons for their admission have been discussed with them and/or available family. They convey agreement and understanding for the need to be admitted and for their admission diagnosis. Consultation has been made with the inpatient physician specialist for hospitalization. Diagnosis Clinical Impression: 1. Acute blood loss anemia Attestations: This note is prepared by Mila More, acting as Scribe for Aracelis Bird, [address] Elisabeth Griffith DO: The scribe's documentation has been prepared under my direction and personally reviewed by me in its entirety. I confirm that the note above accurately reflects all work, treatment, procedures, and medical decision making performed by me. This note will not be viewable in [address]

## 2018-12-08 LAB
ANION GAP SERPL CALC-SCNC: 6 MMOL/L (ref 5–15)
ATRIAL RATE: 73 BPM
BUN SERPL-MCNC: 10 MG/DL (ref 6–20)
BUN/CREAT SERPL: 14 (ref 12–20)
CALCIUM SERPL-MCNC: 8 MG/DL (ref 8.5–10.1)
CALCULATED P AXIS, ECG09: 27 DEGREES
CALCULATED R AXIS, ECG10: -3 DEGREES
CALCULATED T AXIS, ECG11: 44 DEGREES
CHLORIDE SERPL-SCNC: 109 MMOL/L (ref 97–108)
CO2 SERPL-SCNC: 26 MMOL/L (ref 21–32)
COMMENT, HOLDF: NORMAL
CREAT SERPL-MCNC: 0.73 MG/DL (ref 0.55–1.02)
DIAGNOSIS, 93000: NORMAL
ERYTHROCYTE [DISTWIDTH] IN BLOOD BY AUTOMATED COUNT: 19.9 % (ref 11.5–14.5)
GLUCOSE SERPL-MCNC: 100 MG/DL (ref 65–100)
HCT VFR BLD AUTO: 24.2 % (ref 35–47)
HCT VFR BLD AUTO: 26 % (ref 35–47)
HCT VFR BLD AUTO: 26.2 % (ref 35–47)
HCT VFR BLD AUTO: 26.3 % (ref 35–47)
HGB BLD-MCNC: 6.6 G/DL (ref 11.5–16)
HGB BLD-MCNC: 7.4 G/DL (ref 11.5–16)
HGB BLD-MCNC: 7.5 G/DL (ref 11.5–16)
HGB BLD-MCNC: 7.5 G/DL (ref 11.5–16)
MCH RBC QN AUTO: 18.6 PG (ref 26–34)
MCHC RBC AUTO-ENTMCNC: 27.3 G/DL (ref 30–36.5)
MCV RBC AUTO: 68.4 FL (ref 80–99)
NRBC # BLD: 0 K/UL (ref 0–0.01)
NRBC BLD-RTO: 0 PER 100 WBC
P-R INTERVAL, ECG05: 158 MS
PLATELET # BLD AUTO: 255 K/UL (ref 150–400)
PMV BLD AUTO: 10.7 FL (ref 8.9–12.9)
POTASSIUM SERPL-SCNC: 5.4 MMOL/L (ref 3.5–5.1)
Q-T INTERVAL, ECG07: 404 MS
QRS DURATION, ECG06: 72 MS
QTC CALCULATION (BEZET), ECG08: 445 MS
RBC # BLD AUTO: 3.54 M/UL (ref 3.8–5.2)
SAMPLES BEING HELD,HOLD: NORMAL
SODIUM SERPL-SCNC: 141 MMOL/L (ref 136–145)
VENTRICULAR RATE, ECG03: 73 BPM
WBC # BLD AUTO: 2.9 K/UL (ref 3.6–11)

## 2018-12-08 PROCEDURE — 74011250637 HC RX REV CODE- 250/637: Performed by: INTERNAL MEDICINE

## 2018-12-08 PROCEDURE — 74011000250 HC RX REV CODE- 250: Performed by: INTERNAL MEDICINE

## 2018-12-08 PROCEDURE — 80048 BASIC METABOLIC PNL TOTAL CA: CPT

## 2018-12-08 PROCEDURE — 85027 COMPLETE CBC AUTOMATED: CPT

## 2018-12-08 PROCEDURE — C9113 INJ PANTOPRAZOLE SODIUM, VIA: HCPCS | Performed by: INTERNAL MEDICINE

## 2018-12-08 PROCEDURE — 36430 TRANSFUSION BLD/BLD COMPNT: CPT

## 2018-12-08 PROCEDURE — P9016 RBC LEUKOCYTES REDUCED: HCPCS

## 2018-12-08 PROCEDURE — 65270000015 HC RM PRIVATE ONCOLOGY

## 2018-12-08 PROCEDURE — 74011250636 HC RX REV CODE- 250/636: Performed by: INTERNAL MEDICINE

## 2018-12-08 RX ADMIN — THERA TABS 1 TABLET: TAB at 08:34

## 2018-12-08 RX ADMIN — Medication 10 ML: at 14:00

## 2018-12-08 RX ADMIN — DIAZEPAM 5 MG: 5 TABLET ORAL at 00:09

## 2018-12-08 RX ADMIN — PAROXETINE 20 MG: 20 TABLET, FILM COATED ORAL at 08:34

## 2018-12-08 RX ADMIN — SODIUM CHLORIDE 40 MG: 9 INJECTION, SOLUTION INTRAMUSCULAR; INTRAVENOUS; SUBCUTANEOUS at 21:38

## 2018-12-08 RX ADMIN — Medication 10 ML: at 12:55

## 2018-12-08 RX ADMIN — MECLIZINE HYDROCHLORIDE 25 MG: 25 TABLET ORAL at 08:34

## 2018-12-08 RX ADMIN — Medication 10 ML: at 22:00

## 2018-12-08 RX ADMIN — SODIUM CHLORIDE 40 MG: 9 INJECTION, SOLUTION INTRAMUSCULAR; INTRAVENOUS; SUBCUTANEOUS at 04:23

## 2018-12-08 NOTE — PROGRESS NOTES
Oncology Nursing Communication Tool 10:02 AM 
12/8/2018 Bedside shift change report given to , RN (incoming nurse) by Baljit Castro RN (outgoing nurse) on Vidal Lira. Report included the following information SBAR, Kardex, Intake/Output, MAR, Accordion and Recent Results. Shift Summary: 1 unit PRBC transfused; Regular Diet; GI not doing procedure today or this evening; Possible EGD Monday per MD note?; Pill cam study completed night before; H&H q8hr ordered; No BM; Post transfusion H&H= 7.5, 26.3 Issues for physician to address: Oncology Shift Note Admission Date 12/7/2018 Admission Diagnosis Acute blood loss anemia GI bleed Symptomatic anemia Code Status Full Code Consults IP CONSULT TO GASTROENTEROLOGY Cardiac Monitoring [] Yes [x] No  
  
Purposeful Hourly Rounding [x] Yes   
Anjali Score Total Score: 0 Anjali score 3 or > [] Bed Alarm [] Avasys [] 1:1 sitter [] Patient refused (Place signed refusal form in chart) Pain Managed [x] Yes [] No  
 Key Pain Meds   
    
  
 ibuprofen (MOTRIN) 600 mg tablet Take 1 Tab by mouth every six (6) hours as needed for Pain. Influenza Vaccine Received Flu Vaccine for Current Season (usually Sept-March): Yes Oxygen needs? [x] Room air Oxygen @  []1L    []2L    []3L   []4L    []5L   []6L Use home O2? [] Yes [] No 
Perform O2 challenge test using  smartphrase (.oxygenchallenge) Last bowel movement   
bowel movement Urinary Catheter LDAs Peripheral IV 12/07/18 Right Antecubital (Active) Site Assessment Clean, dry, & intact 12/8/2018  7:37 AM  
Phlebitis Assessment 0 12/8/2018  7:37 AM  
Infiltration Assessment 0 12/8/2018  7:37 AM  
Dressing Status Clean, dry, & intact 12/8/2018  7:37 AM  
Dressing Type Tape;Transparent 12/8/2018  7:37 AM  
Hub Color/Line Status Blue 12/8/2018  7:37 AM  
   
Peripheral IV 12/08/18 Anterior; Inferior;Left;Lower Arm (Active) Site Assessment Clean, dry, & intact 12/8/2018  7:38 AM  
Phlebitis Assessment 0 12/8/2018  7:38 AM  
Infiltration Assessment 0 12/8/2018  7:38 AM  
Dressing Status Clean, dry, & intact 12/8/2018  7:38 AM  
Dressing Type Transparent 12/8/2018  7:38 AM  
                  
  
Readmission Risk Assessment Tool Score Low Risk 12 Total Score 3 Has Seen PCP in Last 6 Months (Yes=3, No=0)  
 5 Pt. Coverage (Medicare=5 , Medicaid, or Self-Pay=4) 4 Charlson Comorbidity Score (Age + Comorbid Conditions) Criteria that do not apply:  
 . Living with Significant Other. Assisted Living. LTAC. SNF. or  
Rehab Patient Length of Stay (>5 days = 3) IP Visits Last 12 Months (1-3=4, 4=9, >4=11) Expected Length of Stay - - - Actual Length of Stay 1 Diana Connors, RN

## 2018-12-08 NOTE — OP NOTES
[address]  OPERATIVE REPORT    Mirela Oquendo  MR#: [id number]  : 1945  ACCOUNT #: [de-identified]   DATE OF SERVICE: 2018    PROCEDURE PLANNED:  Small bowel capsule endoscopy. PROCEDURE PERFORMED:  Small bowel capsule endoscopy. SPECIMENS REMOVED:  None. SEDATION:  Not applicable. ESTIMATED BLOOD LOSS:  None. COMPLICATIONS:  None. PREOPERATIVE DIAGNOSIS:  Anemia with recent dark stool transfusion requiring. POSTOPERATIVE DIAGNOSIS:    1. 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 2.  Gastric polyps. SURGEON:  Callie Zurita MD    ASSISTANTS: n/a. ANESTHESIA:  n/a. IMPLANTS:  n/a. DESCRIPTION OF PROCEDURE:  Informed consent was obtained after the risks, benefits and alternatives were explained in detail including potential for capsule retention which could necessitate surgery in up to 1% of individuals. The patient ingested the capsule in the typical fashion and advanced into the stomach. The 1st gastric image was at 35 seconds. We saw several frames of gastric polyps. At 9 minutes, was a clustering of gastric polyp. They appeared benign predominately the appearance of fundic gland polyps. There was no blood associated with these polyps. We then saw the pylorus at 40 minutes and 59 seconds and there were some small petechiae in that area, but no blood, and then at 54 minutes and 2 seconds was a prominent AVM noted. It appeared to be at the pylorus and it could be discerned if it was on the gastric or duodenal side seen best at 54 minutes and 10 seconds. We labeled the 1st duodenal image at 54 minutes and 38 seconds. We saw an AVM at 54 minutes and 56 seconds, a 3rd AVM at 55 minutes and 34 seconds, which was smaller (or 3rd AVM), then at 56 minutes and 16 seconds, a 4th AVM and then at 58 minutes and 36 seconds, a 5th AVM. A 6th AVM was seen at 59 minutes and 2 seconds. Thereafter, a small AVM was seen more distally at 1 hour 35 minutes and 31 seconds, again at 2 hours and 38 seconds and a small one at 2 hours, 7 minutes and 11 seconds. Then entered the cecum at 3 hours 6 minutes and 44 seconds. Several of the frames were duplicate AVMs. But the most prominent AVMs were in and around the duodenum, but there was no blood associated with these AVMs, but they are the only significant finding other than the gastric polyps and they are felt to be the likely source of her blood loss.     PLAN:  The patient will need to be scheduled for upper endoscopy with push enteroscopy and then, thereafter, consideration for balloon enteroscopy at Wlilie Hernandez MD MM / JEAN  D: 12/08/2018 13:43     T: 12/08/2018 14:17  JOB #: [id number]

## 2018-12-08 NOTE — PROGRESS NOTES
GI Progress Note ([facility]) NAME:Lisbeth Lira :1945 HZS:[id number] ATTG:  PCP: Odessa Silver MD 
Date/Time:  2018 1:36 PM  
 
Primary GI: Dr. Harry Markham Reason for following: anemia, hx of melena Assessment: · Acute post hemorrhagic anemia · Hx of SB AVMs · S/p M2A capsule, pending read · Hx of melena, last BM two days prior to admission Plan: · Await capsule results · Follow GI output · Follow CBCs · Pending capsule, can at least advance to clears if no plans for procedure today Subjective:  
Discussed with RN events overnight. Review of Systems: 
Symptom Y/N Comments  Symptom Y/N Comments Fever/Chills n   Chest Pain n   
Cough n   Headaches n Sputum n   Joint Pain n   
SOB/BALBUENA n   Pruritis/Rash n Tolerating Diet n NPO  Other Could NOT obtain due to:   
 
Objective: VITALS:  
Last 24hrs VS reviewed since prior progress note. Most recent are: 
Visit Vitals /74 Pulse 71 Temp 98.3 °F (36.8 °C) Resp 16 Ht 5' 6\" (1.676 m) Wt 72.3 kg (159 lb 6.3 oz) SpO2 97% BMI 25.73 kg/m² Intake/Output Summary (Last 24 hours) at 2018 1336 Last data filed at 2018 1220 Gross per 24 hour Intake 526.7 ml Output  Net 526.7 ml PHYSICAL EXAM: 
General: WD, WN. Alert, cooperative, no acute distress   
HEENT: NC, Atraumatic. Anicteric sclerae. Lungs:  CTA Bilaterally. No Wheezing/Rhonchi/Rales. Heart:  Regular  rhythm,  No murmur (), No Rubs, No Gallops Abdomen: Soft, Non distended, Non tender.  +Bowel sounds, no HSM Extremities: No c/c/e Neurologic:  Alert and oriented X 3. No acute neurological distress Psych:   Good insight. Not anxious nor agitated. Lab and Radiology Data Reviewed: (see below) Medications Reviewed: (see below) PMH/SH reviewed - no change compared to H&P 
________________________________________________________________________ Total time spent with patient: 15 minutes ________________________________________________________________________ Care Plan discussed with: 
Patient x Family  x  
RN x Hospitalist:  efraín Gutiérrez MD  
 
Procedures: see electronic medical records for all procedures/Xrays and details which were not copied into this note but were reviewed prior to creation of Plan. LABS: 
Recent Labs 12/08/18 
0603 12/07/18 
1333 WBC 2.9* 3.2* HGB 6.6* 5.5* HCT 24.2* 21.3*  
 252 Recent Labs 12/08/18 
0603 12/07/18 
1333  138  
K 5.4* 3.9 * 107 CO2 26 25 BUN 10 17 CREA 0.73 0.73  97 CA 8.0* 8.5 Recent Labs 12/07/18 
1333 SGOT 19 AP 57  
TP 7.4 ALB 4.0  
GLOB 3.4 No results for input(s): INR, PTP, APTT in the last 72 hours. No lab exists for component: INREXT No results for input(s): FE, TIBC, PSAT, FERR in the last 72 hours. Lab Results Component Value Date/Time Folate 18.2 04/11/2011 03:06 PM  
 
No results for input(s): PH, PCO2, PO2 in the last 72 hours. No results for input(s): CPK, CKMB in the last 72 hours. No lab exists for component: TROPONINI Lab Results Component Value Date/Time Color YELLOW/STRAW 05/27/2016 11:51 AM  
 Appearance CLEAR 05/27/2016 11:51 AM  
 Specific gravity 1.026 05/27/2016 11:51 AM  
 Specific gravity 1.015 01/24/2016 05:00 PM  
 pH (UA) 6.5 05/27/2016 11:51 AM  
 Protein NEGATIVE  05/27/2016 11:51 AM  
 Glucose NEGATIVE  05/27/2016 11:51 AM  
 Ketone NEGATIVE  05/27/2016 11:51 AM  
 Bilirubin NEGATIVE  05/27/2016 11:51 AM  
 Urobilinogen 1.0 05/27/2016 11:51 AM  
 Nitrites NEGATIVE  05/27/2016 11:51 AM  
 Leukocyte Esterase SMALL (A) 05/27/2016 11:51 AM  
 Epithelial cells FEW 05/27/2016 11:51 AM  
 Bacteria NEGATIVE  05/27/2016 11:51 AM  
 WBC 0-4 05/27/2016 11:51 AM  
 RBC 0-5 05/27/2016 11:51 AM  
 
 
MEDICATIONS: 
Current Facility-Administered Medications Medication Dose Route Frequency  0.9% sodium chloride infusion 250 mL  250 mL IntraVENous PRN  
 diazePAM (VALIUM) tablet 5 mg  5 mg Oral Q6H PRN  
 meclizine (ANTIVERT) tablet 25 mg  25 mg Oral DAILY  PARoxetine (PAXIL) tablet 20 mg  20 mg Oral DAILY  therapeutic multivitamin (THERAGRAN) tablet 1 Tab  1 Tab Oral DAILY  sodium chloride (NS) flush 5-10 mL  5-10 mL IntraVENous Q8H  
 sodium chloride (NS) flush 5-10 mL  5-10 mL IntraVENous PRN  
 ondansetron (ZOFRAN) injection 4 mg  4 mg IntraVENous Q4H PRN  
 dextrose 5% and 0.9% NaCl infusion  75 mL/hr IntraVENous CONTINUOUS  
 pantoprazole (PROTONIX) 40 mg in sodium chloride 0.9% 10 mL injection  40 mg IntraVENous Q12H

## 2018-12-08 NOTE — PROGRESS NOTES
PILL CAM REPORT DICTATED # [id number] Findings: 
Multiple AVMs (mostly clustered in the proximal small bowel : Duodenum)  Nonbleeding Plan: 
Needs EGD with push enteroscopy to be scheduled on Monday

## 2018-12-09 LAB
ABO + RH BLD: NORMAL
ANTIGENS PRESENT BLD: NORMAL
ANTIGENS PRESENT RBC DONR: NORMAL
ANTIGENS PRESENT RBC DONR: NORMAL
BLD PROD TYP BPU: NORMAL
BLD PROD TYP BPU: NORMAL
BLOOD GROUP ANTIBODIES SERPL: NORMAL
BLOOD GROUP ANTIBODIES SERPL: NORMAL
BPU ID: NORMAL
BPU ID: NORMAL
CROSSMATCH RESULT,%XM: NORMAL
CROSSMATCH RESULT,%XM: NORMAL
HCT VFR BLD AUTO: 27.2 % (ref 35–47)
HGB BLD-MCNC: 7.5 G/DL (ref 11.5–16)
SPECIMEN EXP DATE BLD: NORMAL
STATUS OF UNIT,%ST: NORMAL
STATUS OF UNIT,%ST: NORMAL
UNIT DIVISION, %UDIV: 0
UNIT DIVISION, %UDIV: 0

## 2018-12-09 PROCEDURE — 74011250636 HC RX REV CODE- 250/636: Performed by: INTERNAL MEDICINE

## 2018-12-09 PROCEDURE — C9113 INJ PANTOPRAZOLE SODIUM, VIA: HCPCS | Performed by: INTERNAL MEDICINE

## 2018-12-09 PROCEDURE — 74011000258 HC RX REV CODE- 258: Performed by: INTERNAL MEDICINE

## 2018-12-09 PROCEDURE — 65270000015 HC RM PRIVATE ONCOLOGY

## 2018-12-09 PROCEDURE — 74011000250 HC RX REV CODE- 250: Performed by: INTERNAL MEDICINE

## 2018-12-09 PROCEDURE — 85018 HEMOGLOBIN: CPT

## 2018-12-09 PROCEDURE — 36415 COLL VENOUS BLD VENIPUNCTURE: CPT

## 2018-12-09 PROCEDURE — 74011250637 HC RX REV CODE- 250/637: Performed by: INTERNAL MEDICINE

## 2018-12-09 RX ADMIN — SODIUM CHLORIDE 40 MG: 9 INJECTION, SOLUTION INTRAMUSCULAR; INTRAVENOUS; SUBCUTANEOUS at 08:50

## 2018-12-09 RX ADMIN — PAROXETINE 20 MG: 20 TABLET, FILM COATED ORAL at 08:51

## 2018-12-09 RX ADMIN — DEXTROSE MONOHYDRATE AND SODIUM CHLORIDE 75 ML/HR: 5; .9 INJECTION, SOLUTION INTRAVENOUS at 05:02

## 2018-12-09 RX ADMIN — SODIUM CHLORIDE 40 MG: 9 INJECTION, SOLUTION INTRAMUSCULAR; INTRAVENOUS; SUBCUTANEOUS at 21:31

## 2018-12-09 RX ADMIN — THERA TABS 1 TABLET: TAB at 08:51

## 2018-12-09 RX ADMIN — Medication 10 ML: at 21:32

## 2018-12-09 RX ADMIN — Medication 10 ML: at 21:33

## 2018-12-09 RX ADMIN — MECLIZINE HYDROCHLORIDE 25 MG: 25 TABLET ORAL at 08:50

## 2018-12-09 RX ADMIN — Medication 10 ML: at 06:00

## 2018-12-09 NOTE — PROGRESS NOTES
Hospitalist Progress Note NAME: Shan Boyle :  1945 MRN:  [id number] Assessment / Plan: 
Acute Blood loss anemia POA- Hb improved & stable at 7.5 s/p 2 PRBC total 
Causing Symptomatic Anemia POA Due to Likely upper GI Bleed POA H/o Gastric/Duodenal AVMs Hb= 5.5-->6.6-->7.5 today AM & stable H/o melena x 1 week ago 
  
S/p  total 2 units PRBC this admission CBC in AM 
S/p Pill cam - results noted as below: 1. 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 2.  Gastric polyps Clears for now-  NPO p MN per GI for upper endoscopy with push enteroscopy on Monday AM and then, thereafter, consideration for balloon enteroscopy at [facility] in future- timing to be determined Cont IV protonix BID for now Check stool for occult blood when available IP GI consulted-following Avoid NSAIDs - discussed with pt 
  
H/o Vertigo- refractory Cont meclizine daily per pt's request 
  
Anxiety disorder Cont home psych meds 
  
  
Code Status: Full Surrogate Decision Maker: Allison Mendoza # [phone] 
  
DVT Prophylaxis: SCDs GI Prophylaxis: PPI as above 
  
Baseline: Pt lives alone, independent with ADLs Recommended Disposition: Home w/Family soon Subjective: Chief Complaint / Reason for Physician Visit : F/U Anemia, GI Bleeding, Duodenal AVMs \"I am fine\". Discussed with RN events overnight. Review of Systems: 
Symptom Y/N Comments  Symptom Y/N Comments Fever/Chills n   Chest Pain n   
Poor Appetite n   Edema n   
Cough n   Abdominal Pain n   
Sputum n   Joint Pain n   
SOB/BALBUENA n   Pruritis/Rash Nausea/vomit n   Tolerating PT/OT y Diarrhea n   Tolerating Diet y Constipation    Other Could NOT obtain due to:   
 
Objective: VITALS:  
Last 24hrs VS reviewed since prior progress note. Most recent are: 
Patient Vitals for the past 24 hrs: 
 Temp Pulse Resp BP SpO2 12/08/18 2259 98 °F (36.7 °C) 77 17 110/50 95 % 12/08/18 1939 98 °F (36.7 °C) 84 19 119/67 96 % 12/08/18 1537 99 °F (37.2 °C) 83 18 149/65 99 % 12/08/18 1220 98.3 °F (36.8 °C) 71 16 131/74 97 % 12/08/18 1152 98.4 °F (36.9 °C) 72 16 131/74 97 % 12/08/18 1118 98.2 °F (36.8 °C) 73 16 132/75 98 % 12/08/18 1117 98.4 °F (36.9 °C) 74 16 127/68 97 % 12/08/18 1043 98.2 °F (36.8 °C) 75 16 131/74 97 % 12/08/18 1023 98.8 °F (37.1 °C) 74 16 143/73 96 % 12/08/18 0946 98.5 °F (36.9 °C) 75 17 146/75 96 % 12/08/18 0932 98.4 °F (36.9 °C) 73 16 146/75 95 % 12/08/18 0738 98.8 °F (37.1 °C) 75 16 131/59 100 % Intake/Output Summary (Last 24 hours) at 12/9/2018 6265 Last data filed at 12/8/2018 2320 Gross per 24 hour Intake 2461.7 ml Output  Net 2461.7 ml PHYSICAL EXAM: 
General: WD, WN. Alert, cooperative, no acute distress   
EENT:  EOMI. Anicteric sclerae. MMM Resp:  CTA bilaterally, no wheezing or rales. No accessory muscle use CV:  Regular  rhythm,  No edema GI:  Soft, Non distended, Non tender.  +Bowel sounds Neurologic:  Alert and oriented X 3, normal speech, Psych:   Good insight. Not anxious nor agitated Skin:  No rashes. No jaundice Reviewed most current lab test results and cultures  YES Reviewed most current radiology test results   YES Review and summation of old records today    NO Reviewed patient's current orders and MAR    YES 
PMH/SH reviewed - no change compared to H&P 
________________________________________________________________________ Care Plan discussed with: 
  Comments Patient x Family RN x Care Manager Consultant  efraín Rodriguez yesterday Multidiciplinary team rounds were held today with , nursing, pharmacist and clinical coordinator. Patient's plan of care was discussed; medications were reviewed and discharge planning was addressed. ________________________________________________________________________ Total NON critical care TIME:  16   Minutes Total CRITICAL CARE TIME Spent:   Minutes non procedure based Comments >50% of visit spent in counseling and coordination of care    
________________________________________________________________________ Alma Hill MD  
 
Procedures: see electronic medical records for all procedures/Xrays and details which were not copied into this note but were reviewed prior to creation of Plan. LABS: 
I reviewed today's most current labs and imaging studies. Pertinent labs include: 
Recent Labs 12/09/18 
7523 12/08/18 
2301 12/08/18 
1500 12/08/18 
0603  12/07/18 
1333 WBC  --   --   --  2.9*  --  3.2* HGB 7.5* 7.4* 7.5* 6.6*   < > 5.5* HCT 27.2* 26.2* 26.3* 24.2*   < > 21.3*  
PLT  --   --   --  255  --  252  
 < > = values in this interval not displayed. Recent Labs 12/08/18 
0603 12/07/18 
1333  138  
K 5.4* 3.9 * 107 CO2 26 25  97 BUN 10 17 CREA 0.73 0.73 CA 8.0* 8.5 ALB  --  4.0 TBILI  --  0.6 SGOT  --  19 ALT  --  18 Signed: Alma Hill MD

## 2018-12-09 NOTE — PROGRESS NOTES
GI Progress Note ([facility] for Brittni Rogers) NAME:Lisbeth Lira :1945 YNP:[id number] ATTG:  PCP: Jonny Cancino MD 
Date/Time:  2018 1:36 PM  
 
Primary GI: Dr. Brittni Rogers Reason for following: anemia, hx of melena Assessment: · Acute post hemorrhagic anemia · Hx of SB AVMs , seen again on M2A read yesterday by Dr. Brittni Rogers · Hx of melena, last BM two days prior to admission Plan: · Follow GI output · Follow CBCs · NPO p MN 
· EGD+ push enteroscopy tomorrow Subjective:  
Discussed with RN events overnight. No complaints. Reviewed M2A results with patient. Tolerating a diet. Hgb stable today. Review of Systems: 
Symptom Y/N Comments  Symptom Y/N Comments Fever/Chills n   Chest Pain n   
Cough n   Headaches n Sputum n   Joint Pain n   
SOB/BALBUENA n   Pruritis/Rash n Tolerating Diet y   Other Could NOT obtain due to:   
 
Objective: VITALS:  
Last 24hrs VS reviewed since prior progress note. Most recent are: 
Visit Vitals /74 (BP 1 Location: Right arm, BP Patient Position: At rest) Pulse 89 Temp 98.2 °F (36.8 °C) Resp 18 Ht 5' 6\" (1.676 m) Wt 72.3 kg (159 lb 6.3 oz) SpO2 100% BMI 25.73 kg/m² Intake/Output Summary (Last 24 hours) at 2018 1218 Last data filed at 2018 2320 Gross per 24 hour Intake 2461.7 ml Output  Net 2461.7 ml PHYSICAL EXAM: 
General: WD, WN. Alert, cooperative, no acute distress   
HEENT: NC, Atraumatic. Anicteric sclerae. Lungs:  CTA Bilaterally. No Wheezing/Rhonchi/Rales. Heart:  Regular  rhythm,  No murmur (), No Rubs, No Gallops Abdomen: Soft, Non distended, Non tender.  +Bowel sounds, no HSM Extremities: No c/c/e Neurologic:  Alert and oriented X 3. No acute neurological distress Psych:   Good insight. Not anxious nor agitated. Lab and Radiology Data Reviewed: (see below) Medications Reviewed: (see below) PMH/SH reviewed - no change compared to H&P 
 ________________________________________________________________________ Total time spent with patient: 15 minutes ________________________________________________________________________ Care Plan discussed with: 
Patient x Family RN Hospitalist:    
 
Charissa Alvarez MD  
 
Procedures: see electronic medical records for all procedures/Xrays and details which were not copied into this note but were reviewed prior to creation of Plan. LABS: 
Recent Labs 12/09/18 
1503 12/08/18 
2301  12/08/18 
0603  12/07/18 
1333 WBC  --   --   --  2.9*  --  3.2* HGB 7.5* 7.4*   < > 6.6*   < > 5.5* HCT 27.2* 26.2*   < > 24.2*   < > 21.3*  
PLT  --   --   --  255  --  252  
 < > = values in this interval not displayed. Recent Labs 12/08/18 
0603 12/07/18 
1333  138  
K 5.4* 3.9 * 107 CO2 26 25 BUN 10 17 CREA 0.73 0.73  97 CA 8.0* 8.5 Recent Labs 12/07/18 
1333 SGOT 19 AP 57  
TP 7.4 ALB 4.0  
GLOB 3.4 No results for input(s): INR, PTP, APTT in the last 72 hours. No lab exists for component: INREXT, INREXT No results for input(s): FE, TIBC, PSAT, FERR in the last 72 hours. Lab Results Component Value Date/Time Folate 18.2 04/11/2011 03:06 PM  
 
No results for input(s): PH, PCO2, PO2 in the last 72 hours. No results for input(s): CPK, CKMB in the last 72 hours. No lab exists for component: TROPONINI Lab Results Component Value Date/Time  Color YELLOW/STRAW 05/27/2016 11:51 AM  
 Appearance CLEAR 05/27/2016 11:51 AM  
 Specific gravity 1.026 05/27/2016 11:51 AM  
 Specific gravity 1.015 01/24/2016 05:00 PM  
 pH (UA) 6.5 05/27/2016 11:51 AM  
 Protein NEGATIVE  05/27/2016 11:51 AM  
 Glucose NEGATIVE  05/27/2016 11:51 AM  
 Ketone NEGATIVE  05/27/2016 11:51 AM  
 Bilirubin NEGATIVE  05/27/2016 11:51 AM  
 Urobilinogen 1.0 05/27/2016 11:51 AM  
 Nitrites NEGATIVE  05/27/2016 11:51 AM  
 Leukocyte Esterase SMALL (A) 05/27/2016 11:51 AM  
 Epithelial cells FEW 05/27/2016 11:51 AM  
 Bacteria NEGATIVE  05/27/2016 11:51 AM  
 WBC 0-4 05/27/2016 11:51 AM  
 RBC 0-5 05/27/2016 11:51 AM  
 
 
MEDICATIONS: 
Current Facility-Administered Medications Medication Dose Route Frequency  0.9% sodium chloride infusion 250 mL  250 mL IntraVENous PRN  
 diazePAM (VALIUM) tablet 5 mg  5 mg Oral Q6H PRN  
 meclizine (ANTIVERT) tablet 25 mg  25 mg Oral DAILY  PARoxetine (PAXIL) tablet 20 mg  20 mg Oral DAILY  therapeutic multivitamin (THERAGRAN) tablet 1 Tab  1 Tab Oral DAILY  sodium chloride (NS) flush 5-10 mL  5-10 mL IntraVENous Q8H  
 sodium chloride (NS) flush 5-10 mL  5-10 mL IntraVENous PRN  
 ondansetron (ZOFRAN) injection 4 mg  4 mg IntraVENous Q4H PRN  pantoprazole (PROTONIX) 40 mg in sodium chloride 0.9% 10 mL injection  40 mg IntraVENous Q12H

## 2018-12-09 NOTE — PROGRESS NOTES
Oncology Nursing Communication Tool 11:52 AM 
12/9/2018 Bedside shift change report given to Becca Meléndez RN (incoming nurse) by Jada Santos RN (outgoing nurse) on Carter Garb. Report included the following information SBAR, Kardex, Intake/Output, MAR, Accordion and Recent Results. Shift Summary: EGD with Push Endoscopy tomorrow with Dr. Milka Christine; Clear liquid diet today and NPO at midnight; Hgb 7.5 this AM 
  
 Issues for physician to address: Oncology Shift Note Admission Date 12/7/2018 Admission Diagnosis Acute blood loss anemia GI bleed Symptomatic anemia Code Status Full Code Consults IP CONSULT TO GASTROENTEROLOGY Cardiac Monitoring [] Yes [x] No  
  
Purposeful Hourly Rounding [x] Yes   
Anjali Score Total Score: 1 Anjali score 3 or > [] Bed Alarm [] Avasys [] 1:1 sitter [] Patient refused (Place signed refusal form in chart) Pain Managed [x] Yes [] No  
 Key Pain Meds   
    
  
 ibuprofen (MOTRIN) 600 mg tablet Take 1 Tab by mouth every six (6) hours as needed for Pain. Influenza Vaccine Received Flu Vaccine for Current Season (usually Sept-March): Yes Oxygen needs? [x] Room air Oxygen @  []1L    []2L    []3L   []4L    []5L   []6L Use home O2? [] Yes [] No 
Perform O2 challenge test using  smartphrase (.oxygenchallenge) Last bowel movement Last Bowel Movement Date: 12/05/18 
bowel movement Urinary Catheter LDAs Peripheral IV 12/08/18 Anterior; Inferior;Left;Lower Arm (Active) Site Assessment Clean, dry, & intact 12/9/2018  8:01 AM  
Phlebitis Assessment 0 12/9/2018  8:01 AM  
Infiltration Assessment 0 12/9/2018  8:01 AM  
Dressing Status Clean, dry, & intact 12/9/2018  8:01 AM  
Dressing Type Tape;Transparent 12/9/2018  8:01 AM  
Hub Color/Line Status Yellow 12/9/2018  8:01 AM  
Action Taken Other (comment) 12/9/2018  8:01 AM  
   
Peripheral IV 12/08/18 Left Arm (Active) Site Assessment Clean, dry, & intact 12/9/2018  8:01 AM  
Phlebitis Assessment 0 12/9/2018  8:01 AM  
Infiltration Assessment 0 12/9/2018  8:01 AM  
Dressing Status Clean, dry, & intact 12/9/2018  8:01 AM  
Dressing Type Tape;Transparent 12/9/2018  8:01 AM  
Hub Color/Line Status Pink 12/9/2018  8:01 AM  
Alcohol Cap Used Yes 12/8/2018  5:02 PM  
                  
  
Readmission Risk Assessment Tool Score Low Risk 12 Total Score 3 Has Seen PCP in Last 6 Months (Yes=3, No=0)  
 5 Pt. Coverage (Medicare=5 , Medicaid, or Self-Pay=4) 4 Charlson Comorbidity Score (Age + Comorbid Conditions) Criteria that do not apply:  
 . Living with Significant Other. Assisted Living. LTAC. SNF. or  
Rehab Patient Length of Stay (>5 days = 3) IP Visits Last 12 Months (1-3=4, 4=9, >4=11) Expected Length of Stay - - - Actual Length of Stay 2 Devaughn Corona, RN

## 2018-12-10 ENCOUNTER — ANESTHESIA (OUTPATIENT)
Dept: ENDOSCOPY | Age: 73
DRG: 378 | End: 2018-12-10
Payer: MEDICARE

## 2018-12-10 ENCOUNTER — ANESTHESIA EVENT (OUTPATIENT)
Dept: ENDOSCOPY | Age: 73
DRG: 378 | End: 2018-12-10
Payer: MEDICARE

## 2018-12-10 VITALS
SYSTOLIC BLOOD PRESSURE: 102 MMHG | HEART RATE: 93 BPM | OXYGEN SATURATION: 99 % | WEIGHT: 159 LBS | HEIGHT: 66 IN | DIASTOLIC BLOOD PRESSURE: 85 MMHG | TEMPERATURE: 98.8 F | BODY MASS INDEX: 25.55 KG/M2 | RESPIRATION RATE: 12 BRPM

## 2018-12-10 LAB
ANION GAP SERPL CALC-SCNC: 6 MMOL/L (ref 5–15)
BUN SERPL-MCNC: 7 MG/DL (ref 6–20)
BUN/CREAT SERPL: 9 (ref 12–20)
CALCIUM SERPL-MCNC: 8.6 MG/DL (ref 8.5–10.1)
CHLORIDE SERPL-SCNC: 110 MMOL/L (ref 97–108)
CO2 SERPL-SCNC: 25 MMOL/L (ref 21–32)
CREAT SERPL-MCNC: 0.78 MG/DL (ref 0.55–1.02)
ERYTHROCYTE [DISTWIDTH] IN BLOOD BY AUTOMATED COUNT: 21.3 % (ref 11.5–14.5)
GLUCOSE SERPL-MCNC: 92 MG/DL (ref 65–100)
HCT VFR BLD AUTO: 30.5 % (ref 35–47)
HGB BLD-MCNC: 8.5 G/DL (ref 11.5–16)
MCH RBC QN AUTO: 19.4 PG (ref 26–34)
MCHC RBC AUTO-ENTMCNC: 27.9 G/DL (ref 30–36.5)
MCV RBC AUTO: 69.5 FL (ref 80–99)
NRBC # BLD: 0 K/UL (ref 0–0.01)
NRBC BLD-RTO: 0 PER 100 WBC
PLATELET # BLD AUTO: 202 K/UL (ref 150–400)
PMV BLD AUTO: 10.3 FL (ref 8.9–12.9)
POTASSIUM SERPL-SCNC: 4.1 MMOL/L (ref 3.5–5.1)
RBC # BLD AUTO: 4.39 M/UL (ref 3.8–5.2)
SODIUM SERPL-SCNC: 141 MMOL/L (ref 136–145)
WBC # BLD AUTO: 3 K/UL (ref 3.6–11)

## 2018-12-10 PROCEDURE — 76040000019: Performed by: SPECIALIST

## 2018-12-10 PROCEDURE — 74011250637 HC RX REV CODE- 250/637: Performed by: INTERNAL MEDICINE

## 2018-12-10 PROCEDURE — 77030010936 HC CLP LIG BSC -C: Performed by: SPECIALIST

## 2018-12-10 PROCEDURE — 0W3P8ZZ CONTROL BLEEDING IN GASTROINTESTINAL TRACT, VIA NATURAL OR ARTIFICIAL OPENING ENDOSCOPIC: ICD-10-PCS | Performed by: SPECIALIST

## 2018-12-10 PROCEDURE — 36415 COLL VENOUS BLD VENIPUNCTURE: CPT

## 2018-12-10 PROCEDURE — 85027 COMPLETE CBC AUTOMATED: CPT

## 2018-12-10 PROCEDURE — 80048 BASIC METABOLIC PNL TOTAL CA: CPT

## 2018-12-10 PROCEDURE — C9113 INJ PANTOPRAZOLE SODIUM, VIA: HCPCS | Performed by: INTERNAL MEDICINE

## 2018-12-10 PROCEDURE — 74011000250 HC RX REV CODE- 250: Performed by: INTERNAL MEDICINE

## 2018-12-10 PROCEDURE — 74011250636 HC RX REV CODE- 250/636: Performed by: SPECIALIST

## 2018-12-10 PROCEDURE — 76060000031 HC ANESTHESIA FIRST 0.5 HR: Performed by: SPECIALIST

## 2018-12-10 PROCEDURE — 74011250636 HC RX REV CODE- 250/636

## 2018-12-10 PROCEDURE — 74011250636 HC RX REV CODE- 250/636: Performed by: INTERNAL MEDICINE

## 2018-12-10 PROCEDURE — 77030008565 HC TBNG SUC IRR ERBE -B: Performed by: SPECIALIST

## 2018-12-10 RX ORDER — LANOLIN ALCOHOL/MO/W.PET/CERES
325 CREAM (GRAM) TOPICAL 2 TIMES DAILY WITH MEALS
Qty: 60 TAB | Refills: 1 | Status: SHIPPED | OUTPATIENT
Start: 2018-12-10 | End: 2019-02-25

## 2018-12-10 RX ORDER — ONDANSETRON 2 MG/ML
INJECTION INTRAMUSCULAR; INTRAVENOUS
Status: COMPLETED
Start: 2018-12-10 | End: 2018-12-10

## 2018-12-10 RX ORDER — PROPOFOL 10 MG/ML
INJECTION, EMULSION INTRAVENOUS AS NEEDED
Status: DISCONTINUED | OUTPATIENT
Start: 2018-12-10 | End: 2018-12-10 | Stop reason: HOSPADM

## 2018-12-10 RX ORDER — NALOXONE HYDROCHLORIDE 0.4 MG/ML
0.4 INJECTION, SOLUTION INTRAMUSCULAR; INTRAVENOUS; SUBCUTANEOUS
Status: DISCONTINUED | OUTPATIENT
Start: 2018-12-10 | End: 2018-12-10 | Stop reason: HOSPADM

## 2018-12-10 RX ORDER — MIDAZOLAM HYDROCHLORIDE 1 MG/ML
.25-5 INJECTION, SOLUTION INTRAMUSCULAR; INTRAVENOUS
Status: DISCONTINUED | OUTPATIENT
Start: 2018-12-10 | End: 2018-12-10 | Stop reason: HOSPADM

## 2018-12-10 RX ORDER — SODIUM CHLORIDE 0.9 % (FLUSH) 0.9 %
5-10 SYRINGE (ML) INJECTION AS NEEDED
Status: DISCONTINUED | OUTPATIENT
Start: 2018-12-10 | End: 2018-12-10 | Stop reason: HOSPADM

## 2018-12-10 RX ORDER — SODIUM CHLORIDE 9 MG/ML
50 INJECTION, SOLUTION INTRAVENOUS CONTINUOUS
Status: DISCONTINUED | OUTPATIENT
Start: 2018-12-10 | End: 2018-12-10 | Stop reason: HOSPADM

## 2018-12-10 RX ORDER — SODIUM CHLORIDE 0.9 % (FLUSH) 0.9 %
5-10 SYRINGE (ML) INJECTION EVERY 8 HOURS
Status: DISCONTINUED | OUTPATIENT
Start: 2018-12-10 | End: 2018-12-10 | Stop reason: HOSPADM

## 2018-12-10 RX ORDER — LIDOCAINE HYDROCHLORIDE 20 MG/ML
INJECTION, SOLUTION EPIDURAL; INFILTRATION; INTRACAUDAL; PERINEURAL AS NEEDED
Status: DISCONTINUED | OUTPATIENT
Start: 2018-12-10 | End: 2018-12-10

## 2018-12-10 RX ORDER — ONDANSETRON 2 MG/ML
INJECTION INTRAMUSCULAR; INTRAVENOUS AS NEEDED
Status: DISCONTINUED | OUTPATIENT
Start: 2018-12-10 | End: 2018-12-10 | Stop reason: HOSPADM

## 2018-12-10 RX ORDER — FENTANYL CITRATE 50 UG/ML
50 INJECTION, SOLUTION INTRAMUSCULAR; INTRAVENOUS
Status: DISCONTINUED | OUTPATIENT
Start: 2018-12-10 | End: 2018-12-10 | Stop reason: HOSPADM

## 2018-12-10 RX ORDER — FLUMAZENIL 0.1 MG/ML
0.2 INJECTION INTRAVENOUS
Status: DISCONTINUED | OUTPATIENT
Start: 2018-12-10 | End: 2018-12-10 | Stop reason: HOSPADM

## 2018-12-10 RX ORDER — LIDOCAINE HYDROCHLORIDE 20 MG/ML
INJECTION, SOLUTION EPIDURAL; INFILTRATION; INTRACAUDAL; PERINEURAL AS NEEDED
Status: DISCONTINUED | OUTPATIENT
Start: 2018-12-10 | End: 2018-12-10 | Stop reason: HOSPADM

## 2018-12-10 RX ORDER — DEXTROMETHORPHAN/PSEUDOEPHED 2.5-7.5/.8
1.2 DROPS ORAL
Status: DISCONTINUED | OUTPATIENT
Start: 2018-12-10 | End: 2018-12-10 | Stop reason: HOSPADM

## 2018-12-10 RX ADMIN — LIDOCAINE HYDROCHLORIDE 80 MG: 20 INJECTION, SOLUTION EPIDURAL; INFILTRATION; INTRACAUDAL; PERINEURAL at 11:49

## 2018-12-10 RX ADMIN — ONDANSETRON 4 MG: 2 INJECTION INTRAMUSCULAR; INTRAVENOUS at 11:45

## 2018-12-10 RX ADMIN — PROPOFOL 340 MG: 10 INJECTION, EMULSION INTRAVENOUS at 12:15

## 2018-12-10 RX ADMIN — SODIUM CHLORIDE 40 MG: 9 INJECTION, SOLUTION INTRAMUSCULAR; INTRAVENOUS; SUBCUTANEOUS at 09:26

## 2018-12-10 RX ADMIN — SODIUM CHLORIDE 50 ML/HR: 900 INJECTION, SOLUTION INTRAVENOUS at 10:52

## 2018-12-10 NOTE — PROGRESS NOTES
Hospitalist Progress Note NAME: Jovana Bonds :  1945 MRN:  [id number] Assessment / Plan: 
Acute Blood loss anemia POA- Hb improved & stable at 7.5 s/p 2 PRBC total, H/h today pending Causing Symptomatic Anemia POA- seems to have resolved Due to Likely upper GI Bleed POA- due to SB AVMs as on capsule endoscopy H/o Gastric/Duodenal AVMs Hb= 5.5-->6.6-->7.5 & stable H/o melena x 1 week ago 
  
S/p  total 2 units PRBC this admission CBC in this AM Pending- follow S/p Pill cam - results noted as below:1. 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 2.  Gastric polyps Clears for now-  NPO p MN per GI for upper endoscopy with push enteroscopy on today AM and then, thereafter, consideration for balloon enteroscopy at [address] in future- timing to be determined Cont IV protonix BID for now Check stool for occult blood when available IP GI consulted-following- EGD/enteroscopy today noted- DC once cleared by GI- today/24 hrs? ? Avoid NSAIDs - discussed with pt 
  
H/o Vertigo- refractory Cont meclizine daily per pt's request 
  
Anxiety disorder Cont home psych meds 
  
  
Code Status: Full Surrogate Decision Maker: Argenis Lange # [phone] 
  
DVT Prophylaxis: SCDs GI Prophylaxis: PPI as above 
  
Baseline: Pt lives alone, independent with ADLs Recommended Disposition: Home w/Family- DC home once cleared by GI today/24 hrs??  
 
Subjective: Chief Complaint / Reason for Physician Visit : F/U Anemia, GI Bleeding, Duodenal AVMs \"I am fine\". Discussed with RN events overnight. Review of Systems: 
Symptom Y/N Comments  Symptom Y/N Comments Fever/Chills n   Chest Pain n   
Poor Appetite n   Edema n   
Cough n   Abdominal Pain n   
Sputum n   Joint Pain n   
SOB/BALBUENA n   Pruritis/Rash Nausea/vomit n   Tolerating PT/OT y Diarrhea n   Tolerating Diet y NPO p MN for EGD Constipation    Other Could NOT obtain due to:   
 
Objective: VITALS:  
Last 24hrs VS reviewed since prior progress note. Most recent are: 
Patient Vitals for the past 24 hrs: 
 Temp Pulse Resp BP SpO2  
12/09/18 2258 98.5 °F (36.9 °C) 78 18 130/58 100 % 12/09/18 2019 97.9 °F (36.6 °C) 72 18 147/60 100 % 12/09/18 1525 98.6 °F (37 °C) 69 18 138/58 97 % 12/09/18 0801 98.2 °F (36.8 °C) 89 18 148/74 100 % No intake or output data in the 24 hours ending 12/10/18 0756 PHYSICAL EXAM: 
General: WD, WN. Alert, cooperative, no acute distress   
EENT:  EOMI. Anicteric sclerae. MMM Resp:  CTA bilaterally, no wheezing or rales. No accessory muscle use CV:  Regular  rhythm,  No edema GI:  Soft, Non distended, Non tender.  +Bowel sounds Neurologic:  Alert and oriented X 3, normal speech, Psych:   Good insight. Not anxious nor agitated Skin:  No rashes. No jaundice Reviewed most current lab test results and cultures  YES Reviewed most current radiology test results   YES Review and summation of old records today    NO Reviewed patient's current orders and MAR    YES 
PMH/SH reviewed - no change compared to H&P 
________________________________________________________________________ Care Plan discussed with: 
  Comments Patient x Family RN x Care Manager Consultant  x Carmina Slaughter (GI) Multidiciplinary team rounds were held today with , nursing, pharmacist and clinical coordinator. Patient's plan of care was discussed; medications were reviewed and discharge planning was addressed. ________________________________________________________________________ Total NON critical care TIME:  16   Minutes Total CRITICAL CARE TIME Spent:   Minutes non procedure based Comments >50% of visit spent in counseling and coordination of care    
________________________________________________________________________ Landry Capellan MD  
 
 Procedures: see electronic medical records for all procedures/Xrays and details which were not copied into this note but were reviewed prior to creation of Plan. LABS: 
I reviewed today's most current labs and imaging studies. Pertinent labs include: 
Recent Labs 12/09/18 
8996 12/08/18 
2301 12/08/18 
1500 12/08/18 
0603  12/07/18 
1333 WBC  --   --   --  2.9*  --  3.2* HGB 7.5* 7.4* 7.5* 6.6*   < > 5.5* HCT 27.2* 26.2* 26.3* 24.2*   < > 21.3*  
PLT  --   --   --  255  --  252  
 < > = values in this interval not displayed. Recent Labs 12/10/18 
0548 12/08/18 
0603 12/07/18 
1333  141 138  
K 4.1 5.4* 3.9 * 109* 107 CO2 25 26 25 GLU 92 100 97 BUN 7 10 17 CREA 0.78 0.73 0.73 CA 8.6 8.0* 8.5 ALB  --   --  4.0 TBILI  --   --  0.6 SGOT  --   --  19 ALT  --   --  18 Signed: Alex Aragon MD

## 2018-12-10 NOTE — PROCEDURES
Esophagogastroduodenoscopy Procedure Note      Karen Lira  1945  [id number]    Indication:  Chronic GI blood loss anemia; H/O AVMs on Pill Cam     Endoscopist: David Currie MD    Referring Provider:  Shorty Veliz MD    Sedation:  MAC anesthesia Propofol    Procedure Details:  After infomed consent was obtained for the procedure, with all risks and benefits of procedure explained the patient was taken to the endoscopy suite and placed in the left lateral decubitus position. Following sequential administration of sedation as per above, the endoscope was inserted into the mouth and advanced under direct vision to fourth portion of the duodenum. A careful inspection was made as the gastroscope was withdrawn, including a retroflexed view of the proximal stomach; findings and interventions are described below. Findings:     Esophagus:   - Normal mucosa throughout. Z line normal at 39 cm. Stomach:   ++ There was prepyloric foci of streaking with appearance of GAVE (Gastric Antral Vascular Ectasia). We utilized the APC on stomach setting to cauterize the areas.  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 Duodenum:   -->  We then identified multiple AVMs in the duodenum (7-8 total) and utilized the APC on the duodenum setting and cauterized all of these. We placed one hemoclip on the most proximal and largest one. We were able to advance the scope to the 4th portion of the duodenum. We placed a hemoclip at the most distal site reached. Therapies:  See above    Specimen: none            Complications:   None were encountered during the procedure. EBL:  < 10 ml.           Recommendations:   -f/u with Dr. Franchesca Sheppard in 2-3 weeks to discuss repeat EGD/Enteroscopy likely in 6 months  -will need outpatient IV Iron   -advance diet as tolerated  -can be discharged soon from GI standpoint      David Currie MD  12/10/2018  12:14 PM

## 2018-12-10 NOTE — ANESTHESIA POSTPROCEDURE EVALUATION
Procedure(s): ENTEROSCOPY- PUSH 
ENDOSCOPIC ARGON PLASMA COAGULATION 
RESOLUTION CLIP. Anesthesia Post Evaluation Patient location during evaluation: PACU Note status: Adequate. Level of consciousness: responsive to verbal stimuli and sleepy but conscious Pain management: satisfactory to patient Airway patency: patent Anesthetic complications: no 
Cardiovascular status: acceptable Respiratory status: acceptable Hydration status: acceptable Comments: +Post-Anesthesia Evaluation and Assessment Patient: Missael Kapoor MRN: [id number]  SSN: [id number] YOB: 1945  Age: 72 y.o. Sex: female Cardiovascular Function/Vital Signs /85   Pulse 93   Temp 37.1 °C (98.8 °F)   Resp 12   Ht 5' 6\" (1.676 m)   Wt 72.1 kg (159 lb)   SpO2 99%   Breastfeeding? No   BMI 25.66 kg/m² Patient is status post Procedure(s) with comments: 
ENTEROSCOPY- PUSH 
ENDOSCOPIC ARGON PLASMA COAGULATION 
RESOLUTION CLIP - 2 clips. Nausea/Vomiting: Controlled. Postoperative hydration reviewed and adequate. Pain: 
Pain Scale 1: Numeric (0 - 10) (12/10/18 1222) Pain Intensity 1: 0 (12/10/18 1222) Managed. Neurological Status: At baseline. Mental Status and Level of Consciousness: Arousable. Pulmonary Status:  
O2 Device: Room air (12/10/18 1225) Adequate oxygenation and airway patent. Complications related to anesthesia: None Post-anesthesia assessment completed. No concerns. Signed By: Harper Granados DO  
 12/10/2018 Post anesthesia nausea and vomiting:  controlled Visit Vitals /85 Pulse 93 Temp 37.1 °C (98.8 °F) Resp 12 Ht 5' 6\" (1.676 m) Wt 72.1 kg (159 lb) SpO2 99% Breastfeeding? No  
BMI 25.66 kg/m²

## 2018-12-10 NOTE — DISCHARGE INSTRUCTIONS
HOSPITALIST DISCHARGE INSTRUCTIONS    NAME: Jacquelyn Lira   :  1945   MRN:  [id number]     Date/Time:  12/10/2018 3:00 PM    ADMIT DATE: 2018     DISCHARGE DATE: 12/10/2018     DISCHARGE DIAGNOSIS:  Acute Blood loss anemia POA- Hb improved & stable at 8.5 s/p 2 PRBC total,   Causing Symptomatic Anemia POA- seems to have resolved, resume Slow Fe for now, IV iron infusions as per PCPas discussed. Due to Likely upper GI Bleed POA- due to SB AVMs & watermelon stomach- s/p EGD 7 push enteroscopy today by Dr Nicolette Encarnacion with Dr. August Yeboah in 2-3 weeks to discuss repeat EGD/Enteroscopy likely in 6 months  H/o Gastric/Duodenal AVMs  H/o refractory Vertigo-stable now  Anxiety disorder      Active Problems:    GI bleed (2016)      Overview: Duodenal AVMs. In , Hgb dropped from 13 to 5.9 during a bleed. Acute blood loss anemia (2018)      Symptomatic anemia (2018)         MEDICATIONS:  As per medication reconciliation  list  · It is important that you take the medication exactly as they are prescribed. · Keep your medication in the bottles provided by the pharmacist and keep a list of the medication names, dosages, and times to be taken in your wallet. · Do not take other medications without consulting your doctor. Pain Management: per above medications    What to do at Home    Recommended diet:  Regular Diet    Recommended activity: Activity as tolerated    If you have questions regarding the hospital related prescriptions or hospital related issues [facility] [phone].     If you experience any of the following symptoms then please call your primary care physician or return to the emergency room if you cannot get hold of your doctor:  Fever, chills, nausea, vomiting, diarrhea, change in mentation, falling, bleeding, shortness of breath,     Follow Up:  Dr. Tricia Perry MD  you are to call and set up an appointment to see them in 7-10 days for consideration of IV Iron therapy at outpatient [facility] as recommended by Dr Mikaela Early (GI)  f/u with Dr. Mikaela Early in 2-3 weeks to discuss repeat EGD/Enteroscopy likely in 6 months        Information obtained by :  I understand that if any problems occur once I am at home I am to contact my physician. I understand and acknowledge receipt of the instructions indicated above.                                                                                                                                            Physician's or R.N.'s Signature                                                                  Date/Time                                                                                                                                              Patient or Representative Signature                                                          Date/Time

## 2018-12-10 NOTE — PROGRESS NOTES
.. TRANSFER - OUT REPORT: 
 
Verbal report given to Greg(name) on Lisbeth Lira  being transferred to oncology(unit) for routine progression of care Report consisted of patients Situation, Background, Assessment and  
Recommendations(SBAR). Information from the following report(s) SBAR, OR Summary, Procedure Summary, MAR, Accordion and Recent Results was reviewed with the receiving nurse. Lines:  
Peripheral IV 12/08/18 Anterior; Inferior;Left;Lower Arm (Active) Site Assessment Clean, dry, & intact 12/10/2018  8:15 AM  
Phlebitis Assessment 0 12/10/2018  8:15 AM  
Infiltration Assessment 0 12/10/2018  8:15 AM  
Dressing Status Clean, dry, & intact 12/10/2018  8:15 AM  
Dressing Type Transparent;Tape 12/10/2018  8:15 AM  
Hub Color/Line Status Yellow; Flushed 12/10/2018  8:15 AM  
Action Taken Other (comment) 12/9/2018  8:01 AM  
   
Peripheral IV 12/08/18 Left Arm (Active) Site Assessment Clean, dry, & intact 12/10/2018  8:15 AM  
Phlebitis Assessment 0 12/10/2018  8:15 AM  
Infiltration Assessment 0 12/10/2018  8:15 AM  
Dressing Status Clean, dry, & intact 12/10/2018  8:15 AM  
Dressing Type Transparent;Tape 12/10/2018  8:15 AM  
Hub Color/Line Status Pink;Flushed 12/10/2018  8:15 AM  
Alcohol Cap Used Yes 12/8/2018  5:02 PM  
  
 
Opportunity for questions and clarification was provided. Patient transported with: 
 Registered Nurse

## 2018-12-10 NOTE — ANESTHESIA PREPROCEDURE EVALUATION
Anesthetic History PONV Review of Systems / Medical History Patient summary reviewed, nursing notes reviewed and pertinent labs reviewed Pulmonary Within defined limits Neuro/Psych Psychiatric history (anxiety/ panic attacks) Cardiovascular Within defined limits Exercise tolerance: >4 METS 
  
GI/Hepatic/Renal 
  
GERD Hepatitis: type C Comments: Solsi's Endo/Other Anemia (iron def) Other Findings Physical Exam 
 
Airway Mallampati: I 
TM Distance: 4 - 6 cm Neck ROM: decreased range of motion Mouth opening: Normal 
 
 Cardiovascular Rhythm: regular Rate: normal 
 
 
Pertinent negatives: No murmur Dental 
 
Dentition: Full upper dentures Pulmonary Breath sounds clear to auscultation Abdominal 
GI exam deferred Other Findings Anesthetic Plan ASA: 2 Anesthesia type: general and total IV anesthesia Induction: Intravenous Anesthetic plan and risks discussed with: Patient Zofran IV preop for nausea/Propofol MAC

## 2018-12-10 NOTE — DISCHARGE SUMMARY
Hospitalist Discharge Summary     Patient ID:  Ramon Nick  [id number]  72 y.o.  1945    PCP on record: Claudean Null, MD    Admit date: 12/7/2018  Discharge date and time: 12/10/2018      DISCHARGE DIAGNOSIS:    Acute Blood loss anemia POA- Hb improved & stable at 8.5 s/p 2 PRBC total,   Causing Symptomatic Anemia POA- seems to have resolved, resume Slow Fe for now, IV iron infusions as per PCPas discussed. Due to Likely upper GI Bleed POA- due to SB AVMs & watermelon stomach- s/p EGD 7 push enteroscopy today by Dr Yamel Robbins with Dr. Gabi Salazar in 2-3 weeks to discuss repeat EGD/Enteroscopy likely in 6 months  H/o Gastric/Duodenal AVMs  H/o refractory Vertigo-stable now  Anxiety disorder        CONSULTATIONS:  IP CONSULT TO GASTROENTEROLOGY    Excerpted HPI from H&P of Arun Issa MD:  Padmini.[place]ton y.o.  female who presents with above complains from home ambulatory. Pt presents with CC of extreme fatigue , tiredness, x 1 week  H/o associated abdominal sharp pain x 1 day  H/o melena x 1 week back for a week- claims now it has stopped  H/o Gastric AVMs in past with episode of GI bleeding requring EGD/cautrization by dr Bre Kennedy in past  Pt denies any chest pain, palpitations, syncope     Pt was found to have Hb 5.5 in ER on workup with stool guaiac pending .     We were asked to admit for work up and evaluation of the above problems. \"    ______________________________________________________________________  DISCHARGE SUMMARY/HOSPITAL COURSE:  for full details see H&P, daily progress notes, labs, consult notes.      Acute Blood loss anemia POA- Hb improved & stable at 7.5 s/p 2 PRBC total, H/h today pending  Causing Symptomatic Anemia POA- seems to have resolved  Due to Likely upper GI Bleed POA- due to SB AVMs as on capsule endoscopy  H/o Gastric/Duodenal AVMs  Hb= 5.5-->6.6-->7.5 & stable  H/o melena x 1 week ago     S/p  total 2 units PRBC this admission  CBC in this AM Pending- follow  S/p Pill cam - results noted as below:  1. 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 2.  Gastric polyps  Clears for now-  NPO p MN per GI for upper endoscopy with push enteroscopy on today AM and then, thereafter, consideration for balloon enteroscopy at [address] in future- timing to be determined  Cont IV protonix BID for now   Check stool for occult blood when available  IP GI consulted-following- EGD/enteroscopy today noted- DC once cleared by GI- today/24 hrs? ? Avoid NSAIDs - discussed with pt     H/o Vertigo- refractory  Cont meclizine daily per pt's request     Anxiety disorder  Cont home psych meds        _______________________________________________________________________  Patient seen and examined by me on discharge day. Pertinent Findings:  Gen:    Not in distress  Chest: Clear lungs  CVS:   Regular rhythm. No edema  Abd:  Soft, not distended, not tender  Neuro:  Alert, oriented x 3  _______________________________________________________________________  DISCHARGE MEDICATIONS:   Current Discharge Medication List      CONTINUE these medications which have CHANGED    Details   ferrous sulfate 325 mg (65 mg iron) tablet Take 1 Tab by mouth two (2) times daily (with meals). Qty: 60 Tab, Refills: 1         CONTINUE these medications which have NOT CHANGED    Details   PARoxetine (PAXIL) 20 mg tablet TAKE 1 TABLET EVERY DAY  Qty: 90 Tab, Refills: 3      meclizine (ANTIVERT) 25 mg tablet Take 25 mg by mouth daily. pantoprazole (PROTONIX) 20 mg tablet TAKE 1 TABLET EVERY DAY  Qty: 60 Tab, Refills: 2      ondansetron (ZOFRAN ODT) 4 mg disintegrating tablet Take 1 Tab by mouth every eight (8) hours as needed for Nausea. Qty: 10 Tab, Refills: 0      therapeutic multivitamin (THERAGRAN) tablet Take 1 Tab by mouth daily.   Qty: 30 Tab, Refills: 0      diazepam (VALIUM) 5 mg tablet Take 1 Tab by mouth every six (6) hours as needed for Anxiety. Qty: 30 Tab, Refills: 0    Associated Diagnoses: Anxiety state, unspecified      minoxidil (ROGAINE) 2 % external solution Apply  to affected area daily. STOP taking these medications       ibuprofen (MOTRIN) 600 mg tablet Comments:   Reason for Stopping:               My Recommended Diet, Activity, Wound Care, and follow-up labs are listed in the patient's Discharge Insturctions which I have personally completed and reviewed.     ______________________________________________________________________    Risk of deterioration: Low    Condition at Discharge:  Stable  ______________________________________________________________________    Disposition  Home with family, no needs  ______________________________________________________________________    Care Plan discussed with:   Patient, RN, Care Manager, Consultant    ______________________________________________________________________    Code Status: Full Code  ______________________________________________________________________      Follow up with:   PCP : Krish Amado MD  Follow-up Information     Follow up With Specialties Details Why Contact Info    Krish Amado, [address]  [address] [facility]      Christin Banegas MD Gastroenterology Schedule an appointment as soon as possible for a visit within 2-3 weeks GI follow up [address] [place] [address]  [phone]                Total time in minutes spent coordinating this discharge (includes going over instructions, follow-up, prescriptions, and preparing report for sign off to her PCP) :  26 minutes    Signed:  Wilver Del Angel MD

## 2018-12-10 NOTE — PROGRESS NOTES
TRANSFER - IN REPORT: 
 
Verbal report received from [facility] Rn(name) on Mile Lira  being received from Oncology(unit) for routine progression of care Report consisted of patients Situation, Background, Assessment and  
Recommendations(SBAR). Information from the following report(s) SBAR, Kardex and MAR was reviewed with the receiving nurse. Opportunity for questions and clarification was provided. Assessment completed upon patients arrival to unit and care assumed.

## 2018-12-10 NOTE — PROGRESS NOTES
..Anesthesia reports 340mg Propofol, 80mg Lidocaine and 300mL NS given during procedure. Received report from anesthesia staff on vital signs and status of patient.

## 2019-02-25 RX ORDER — PANTOPRAZOLE SODIUM 40 MG/1
40 TABLET, DELAYED RELEASE ORAL DAILY
COMMUNITY

## 2019-02-26 ENCOUNTER — APPOINTMENT (OUTPATIENT)
Dept: GENERAL RADIOLOGY | Age: 74
End: 2019-02-26
Attending: SPECIALIST
Payer: MEDICARE

## 2019-02-26 ENCOUNTER — ANESTHESIA EVENT (OUTPATIENT)
Dept: ENDOSCOPY | Age: 74
End: 2019-02-26
Payer: MEDICARE

## 2019-02-26 ENCOUNTER — ANESTHESIA (OUTPATIENT)
Dept: ENDOSCOPY | Age: 74
End: 2019-02-26
Payer: MEDICARE

## 2019-02-26 ENCOUNTER — HOSPITAL ENCOUNTER (OUTPATIENT)
Age: 74
Setting detail: OUTPATIENT SURGERY
Discharge: HOME | End: 2019-02-26
Attending: SPECIALIST | Admitting: SPECIALIST
Payer: MEDICARE

## 2019-02-26 VITALS
BODY MASS INDEX: 25.55 KG/M2 | RESPIRATION RATE: 15 BRPM | SYSTOLIC BLOOD PRESSURE: 156 MMHG | TEMPERATURE: 98.3 F | HEIGHT: 66 IN | OXYGEN SATURATION: 95 % | WEIGHT: 159 LBS | DIASTOLIC BLOOD PRESSURE: 75 MMHG | HEART RATE: 66 BPM

## 2019-02-26 PROCEDURE — 77030036656: Performed by: SPECIALIST

## 2019-02-26 PROCEDURE — 76060000035 HC ANESTHESIA 2 TO 2.5 HR: Performed by: SPECIALIST

## 2019-02-26 PROCEDURE — 74011000250 HC RX REV CODE- 250

## 2019-02-26 PROCEDURE — 74011250636 HC RX REV CODE- 250/636: Performed by: SPECIALIST

## 2019-02-26 PROCEDURE — 74011250636 HC RX REV CODE- 250/636

## 2019-02-26 PROCEDURE — 77030003657 HC NDL SCLER BSC -B: Performed by: SPECIALIST

## 2019-02-26 PROCEDURE — 76040000003: Performed by: SPECIALIST

## 2019-02-26 PROCEDURE — 77030022875 HC PRB AR PLSM COAG ERBE -C: Performed by: SPECIALIST

## 2019-02-26 PROCEDURE — 77030022711 HC TU ENTRPRO BLN DISP OLSI -C: Performed by: SPECIALIST

## 2019-02-26 RX ORDER — FLUMAZENIL 0.1 MG/ML
0.2 INJECTION INTRAVENOUS
Status: ACTIVE | OUTPATIENT
Start: 2019-02-26 | End: 2019-02-26

## 2019-02-26 RX ORDER — SODIUM CHLORIDE 0.9 % (FLUSH) 0.9 %
5-40 SYRINGE (ML) INJECTION EVERY 8 HOURS
Status: DISCONTINUED | OUTPATIENT
Start: 2019-02-26 | End: 2019-02-26 | Stop reason: HOSPADM

## 2019-02-26 RX ORDER — SODIUM CHLORIDE 0.9 % (FLUSH) 0.9 %
5-40 SYRINGE (ML) INJECTION AS NEEDED
Status: DISCONTINUED | OUTPATIENT
Start: 2019-02-26 | End: 2019-02-26 | Stop reason: HOSPADM

## 2019-02-26 RX ORDER — MIDAZOLAM HYDROCHLORIDE 1 MG/ML
.25-1 INJECTION, SOLUTION INTRAMUSCULAR; INTRAVENOUS
Status: ACTIVE | OUTPATIENT
Start: 2019-02-26 | End: 2019-02-26

## 2019-02-26 RX ORDER — NALOXONE HYDROCHLORIDE 0.4 MG/ML
0.4 INJECTION, SOLUTION INTRAMUSCULAR; INTRAVENOUS; SUBCUTANEOUS
Status: ACTIVE | OUTPATIENT
Start: 2019-02-26 | End: 2019-02-26

## 2019-02-26 RX ORDER — PHENYLEPHRINE HCL IN 0.9% NACL 0.4MG/10ML
SYRINGE (ML) INTRAVENOUS AS NEEDED
Status: DISCONTINUED | OUTPATIENT
Start: 2019-02-26 | End: 2019-02-26 | Stop reason: HOSPADM

## 2019-02-26 RX ORDER — PROPOFOL 10 MG/ML
INJECTION, EMULSION INTRAVENOUS AS NEEDED
Status: DISCONTINUED | OUTPATIENT
Start: 2019-02-26 | End: 2019-02-26 | Stop reason: HOSPADM

## 2019-02-26 RX ORDER — FENTANYL CITRATE 50 UG/ML
INJECTION, SOLUTION INTRAMUSCULAR; INTRAVENOUS AS NEEDED
Status: DISCONTINUED | OUTPATIENT
Start: 2019-02-26 | End: 2019-02-26 | Stop reason: HOSPADM

## 2019-02-26 RX ORDER — GLYCOPYRROLATE 0.2 MG/ML
INJECTION INTRAMUSCULAR; INTRAVENOUS AS NEEDED
Status: DISCONTINUED | OUTPATIENT
Start: 2019-02-26 | End: 2019-02-26 | Stop reason: HOSPADM

## 2019-02-26 RX ORDER — DEXTROMETHORPHAN/PSEUDOEPHED 2.5-7.5/.8
1.2 DROPS ORAL
Status: DISCONTINUED | OUTPATIENT
Start: 2019-02-26 | End: 2019-02-26 | Stop reason: HOSPADM

## 2019-02-26 RX ORDER — NEOSTIGMINE METHYLSULFATE 1 MG/ML
INJECTION INTRAVENOUS AS NEEDED
Status: DISCONTINUED | OUTPATIENT
Start: 2019-02-26 | End: 2019-02-26 | Stop reason: HOSPADM

## 2019-02-26 RX ORDER — LORAZEPAM 2 MG/ML
2 INJECTION INTRAMUSCULAR AS NEEDED
Status: ACTIVE | OUTPATIENT
Start: 2019-02-26 | End: 2019-02-26

## 2019-02-26 RX ORDER — SODIUM CHLORIDE 9 MG/ML
50 INJECTION, SOLUTION INTRAVENOUS CONTINUOUS
Status: DISPENSED | OUTPATIENT
Start: 2019-02-26 | End: 2019-02-26

## 2019-02-26 RX ORDER — SUCCINYLCHOLINE CHLORIDE 20 MG/ML
INJECTION INTRAMUSCULAR; INTRAVENOUS AS NEEDED
Status: DISCONTINUED | OUTPATIENT
Start: 2019-02-26 | End: 2019-02-26 | Stop reason: HOSPADM

## 2019-02-26 RX ORDER — ROCURONIUM BROMIDE 10 MG/ML
INJECTION, SOLUTION INTRAVENOUS AS NEEDED
Status: DISCONTINUED | OUTPATIENT
Start: 2019-02-26 | End: 2019-02-26 | Stop reason: HOSPADM

## 2019-02-26 RX ORDER — SODIUM CHLORIDE 9 MG/ML
INJECTION, SOLUTION INTRAVENOUS
Status: DISCONTINUED | OUTPATIENT
Start: 2019-02-26 | End: 2019-02-26 | Stop reason: HOSPADM

## 2019-02-26 RX ORDER — LIDOCAINE HYDROCHLORIDE 20 MG/ML
INJECTION, SOLUTION EPIDURAL; INFILTRATION; INTRACAUDAL; PERINEURAL AS NEEDED
Status: DISCONTINUED | OUTPATIENT
Start: 2019-02-26 | End: 2019-02-26 | Stop reason: HOSPADM

## 2019-02-26 RX ORDER — ATROPINE SULFATE 0.1 MG/ML
0.5 INJECTION INTRAVENOUS
Status: DISCONTINUED | OUTPATIENT
Start: 2019-02-26 | End: 2019-02-26 | Stop reason: HOSPADM

## 2019-02-26 RX ORDER — EPINEPHRINE 0.1 MG/ML
1 INJECTION INTRACARDIAC; INTRAVENOUS
Status: DISCONTINUED | OUTPATIENT
Start: 2019-02-26 | End: 2019-02-26 | Stop reason: HOSPADM

## 2019-02-26 RX ORDER — FENTANYL CITRATE 50 UG/ML
200 INJECTION, SOLUTION INTRAMUSCULAR; INTRAVENOUS
Status: DISPENSED | OUTPATIENT
Start: 2019-02-26 | End: 2019-02-26

## 2019-02-26 RX ADMIN — PROPOFOL 150 MG: 10 INJECTION, EMULSION INTRAVENOUS at 12:19

## 2019-02-26 RX ADMIN — ROCURONIUM BROMIDE 40 MG: 10 INJECTION, SOLUTION INTRAVENOUS at 12:24

## 2019-02-26 RX ADMIN — SUCCINYLCHOLINE CHLORIDE 160 MG: 20 INJECTION INTRAMUSCULAR; INTRAVENOUS at 12:20

## 2019-02-26 RX ADMIN — PROPOFOL 50 MG: 10 INJECTION, EMULSION INTRAVENOUS at 12:21

## 2019-02-26 RX ADMIN — NEOSTIGMINE METHYLSULFATE 3.5 MG: 1 INJECTION INTRAVENOUS at 13:22

## 2019-02-26 RX ADMIN — FENTANYL CITRATE 100 MCG: 50 INJECTION, SOLUTION INTRAMUSCULAR; INTRAVENOUS at 12:33

## 2019-02-26 RX ADMIN — Medication 80 MCG: at 13:03

## 2019-02-26 RX ADMIN — SODIUM CHLORIDE: 9 INJECTION, SOLUTION INTRAVENOUS at 12:15

## 2019-02-26 RX ADMIN — ROCURONIUM BROMIDE 10 MG: 10 INJECTION, SOLUTION INTRAVENOUS at 12:18

## 2019-02-26 RX ADMIN — LIDOCAINE HYDROCHLORIDE 100 MG: 20 INJECTION, SOLUTION EPIDURAL; INFILTRATION; INTRACAUDAL; PERINEURAL at 12:17

## 2019-02-26 RX ADMIN — SODIUM CHLORIDE: 9 INJECTION, SOLUTION INTRAVENOUS at 13:04

## 2019-02-26 RX ADMIN — GLYCOPYRROLATE 0.4 MG: 0.2 INJECTION INTRAMUSCULAR; INTRAVENOUS at 13:22

## 2019-02-26 NOTE — ANESTHESIA POSTPROCEDURE EVALUATION
Post-Anesthesia Evaluation and Assessment Patient: Carolyn Head MRN: [id number]  SSN: [id number] YOB: 1945  Age: 72 y.o. Sex: female I have evaluated the patient and they are stable and ready for discharge from the PACU. Cardiovascular Function/Vital Signs Visit Vitals /69 Pulse 60 Temp 36.8 °C (98.3 °F) Resp 14 Ht 5' 6\" (1.676 m) Wt 72.1 kg (159 lb) SpO2 100% BMI 25.66 kg/m² Patient is status post General anesthesia for Procedure(s): ENTEROSCOPY 
ENDOSCOPIC ARGON PLASMA COAGULATION INJECTION. Nausea/Vomiting: None Postoperative hydration reviewed and adequate. Pain: 
Pain Scale 1: Numeric (0 - 10) (02/26/19 1344) Pain Intensity 1: 0 (02/26/19 1344) Managed Neurological Status: At baseline Mental Status, Level of Consciousness: Alert and  oriented to person, place, and time Pulmonary Status:  
O2 Device: Room air (02/26/19 1344) Adequate oxygenation and airway patent Complications related to anesthesia: None Post-anesthesia assessment completed. No concerns Signed By: Inga Patrick MD   
 February 26, 2019 Procedure(s): ENTEROSCOPY 
ENDOSCOPIC ARGON PLASMA COAGULATION INJECTION. <BSHSIANPOST> Visit Vitals /69 Pulse 60 Temp 36.8 °C (98.3 °F) Resp 14 Ht 5' 6\" (1.676 m) Wt 72.1 kg (159 lb) SpO2 100% BMI 25.66 kg/m²

## 2019-02-26 NOTE — H&P
Pre-endoscopy H and P The patient was seen and examined. The airway was assessed and documented. The problem list, past medical history, and medications were reviewed. Patient Active Problem List  
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  
 S/P JESUS (total abdominal hysterectomy) Z90.710  H/O cervical spine surgery Z98.890  
 Acute blood loss anemia D62  Symptomatic anemia D64.9 Social History Socioeconomic History  Marital status:  Spouse name: Not on file  Number of children: Not on file  Years of education: Not on file  Highest education level: Not on file Social Needs  Financial resource strain: Not on file  Food insecurity - worry: Not on file  Food insecurity - inability: Not on file  Transportation needs - medical: Not on file  Transportation needs - non-medical: Not on file Occupational History  Not on file Tobacco Use  Smoking status: Former Smoker Packs/day: 1.00 Years: 30.00 Pack years: 30.00 Last attempt to quit: 1994 Years since quittin.9  Smokeless tobacco: Never Used  Tobacco comment: former cigarette smoker Substance and Sexual Activity  Alcohol use: Yes Alcohol/week: 3.6 oz Types: 6 Cans of beer per week  Drug use: No  
 Sexual activity: No  
Other Topics Concern  Not on file Social History Narrative  at [employer]. Lives alone Past Medical History:  
Diagnosis Date  GERD (gastroesophageal reflux disease)  GI bleed Duodenal AVMs  Ill-defined condition   
 hepatitis C  
 Ill-defined condition   
 small gallstones  Ill-defined condition Solis's esophagus  Irregular heart beat   
 evaluated and thought to be panic attacks  Nausea & vomiting \"sick from not eating\"/gets something for N/V prior to anesthesia  Psychiatric disorder   
 anxiety The patient has a family history of na Prior to Admission Medications Prescriptions Last Dose Informant Patient Reported? Taking? ACETAMINOPHEN EXTRA STRENGTH PO 2/19/2019 at Unknown time  Yes Yes Sig: Take  by mouth as needed. PARoxetine (PAXIL) 20 mg tablet 2/25/2019 at Unknown time  No Yes Sig: TAKE 1 TABLET EVERY DAY  
diazepam (VALIUM) 5 mg tablet 1/26/2019 at Unknown time  No Yes Sig: Take 1 Tab by mouth every six (6) hours as needed for Anxiety. ferrous sulfate (SLOW FE PO) 2/25/2019 at Unknown time  Yes Yes Sig: Take 45 mg by mouth daily. meclizine (ANTIVERT) 25 mg tablet 2/25/2019 at Unknown time  Yes Yes Sig: Take 25 mg by mouth daily. minoxidil (ROGAINE) 2 % external solution 2/25/2019 at Unknown time  Yes Yes Sig: Apply  to affected area daily. ondansetron (ZOFRAN ODT) 4 mg disintegrating tablet Unknown at Unknown time  No No  
Sig: Take 1 Tab by mouth every eight (8) hours as needed for Nausea. pantoprazole (PROTONIX) 40 mg tablet 2/25/2019 at Unknown time  Yes Yes Sig: Take 40 mg by mouth daily. therapeutic multivitamin (THERAGRAN) tablet 2/25/2019 at Unknown time  No Yes Sig: Take 1 Tab by mouth daily. Facility-Administered Medications: None The review of systems is:  negative for shortness of breath or chest pain The heart, lungs and mental status were satisfactory for the administration of MAC sedation and for the procedure. Mallampati score: See Anesthesia. I discussed with the patient the objectives, risks, consequences and alternatives to the procedure. Plan: Endoscopic procedure with MAC sedation.  
 
Sharmin Perez MD 
2/26/2019 
12:11 PM

## 2019-02-26 NOTE — ROUTINE PROCESS
Darline Castillo 1945 
[id number] Situation: 
Verbal report received from: Mesha Gunderson RN Procedure: Procedure(s): ENTEROSCOPY 
ENDOSCOPIC ARGON PLASMA COAGULATION INJECTION Background: 
 
Preoperative diagnosis: ATERIOVENOUS MALFORMATION Postoperative diagnosis: ATERIOVENOUS MALFORMATION :  Dr. Amber Scott Assistant(s): Endoscopy Technician-1: Lashay Spear IV 
Endoscopy RN-1: Camille Velasco RN Specimens: * No specimens in log * H. Pylori  no Assessment: 
Intra-procedure medications Anesthesia gave intra-procedure sedation and medications, see anesthesia flow sheet yes Intravenous fluids: NS@ Chyna Short Vital signs stable Abdominal assessment: round and soft Recommendation: 
Discharge patient per MD order. Family or Friend Permission to share finding with family or friend yes

## 2019-02-26 NOTE — PROCEDURES
[address]  [address] [facility], [address] [place] [address]       SINGLE BALLOON ENTEROSCOPY PROCEDURE NOTE      Kinjal Lira  1945  [id number]      Procedure: Single Balloon Enteroscopy with APC ablation and [address] tattoo    Indication:  Gastrointestinal hemorrhage, unspecified, Iron deficiency anemia, documented small bowel AVMs some already treated others seen on CE not reaxchable with push enteroscopy    Surgical Assistant: None    Implants: None    : Dr. Qasim Fay MD    Referring Provider: Maryhelen Lanes, MD      Anesthesia/Sedation: General anesthesia . Procedure Details    After infomed consent was obtained for the procedure, with all risks and  benefits of procedure explained the patient was taken to the endoscopy  suite and placed in the left lateral decubitus position. Following  sequential administration of sedation as per above, a standard EGD was performed. Findings are described below. Next, the enteroscope was  inserted into the mouth and advanced under direct vision to mid-jejunum  A careful inspection was completed, findings and interventions  are described below. Findings:    Esophagus:normal     Stomach: several linear erosions of the antrum thought to be site of prior Rx  for angioectasia (appearance not typical GAVE or AVM) ablated with APC    Duodenum: several nonbleeding typical AVMs ablated with APC probe    Jejunum: one typical and multiple small red spots ablated with APC more distally in mid jejunum nothing seen. Furthest distal advance marked with single [address] tattoo    ileum: not intubated      EBL: none      Therapies: See above      Specimens: See above    Complications: None; patient tolerated the procedure well. Impression:   1. Recurrent AVM associated GI blood loss and iron deficiency anemia    Recommendations:  1. Iron infusions as needed  2.  Urgent pillcam study for overt bleeding or rapid drop in Hgb      Praful Pablo oRnni Griffith MD    2/26/2019 2:01 PM

## 2019-02-26 NOTE — DISCHARGE INSTRUCTIONS
Mariah Lira  [id number]  1945    ENTEROSCOPY DISCHARGE INSTRUCTIONS  Discomfort:  Sore throat- throat lozenges or warm salt water gargle  redness at IV site- apply warm compress to area; if redness or soreness persist- contact your physician  Gaseous discomfort- walking, belching will help relieve any discomfort  You may not operate a vehicle for 12 hours  You may not engage in an occupation involving machinery or appliances for rest of today. You may not drink alcoholic beverages for at least 12 hours  Avoid making any critical decisions for at least 24 hour  DIET  You may resume your regular diet - however -  remember your colon is empty and a heavy meal will produce gas. Avoid these foods:  vegetables, fried / greasy foods, carbonated drinks  ACTIVITY  You may resume your normal daily activities. Spend the remainder of the day resting -  avoid any strenuous activity. CALL M.D. ANY SIGN OF   Increasing pain, nausea, vomiting  Abdominal distension (swelling)  New increased bleeding (oral or rectal)  Fever (chills)  Pain in chest area  Bloody discharge from nose or mouth  Shortness of breath    You may not take any Advil, Aspirin, Ibuprofen, Motrin, Aleve, or Goodys, ONLY  Tylenol as needed for pain. Follow-up Instructions:   Call 's office for follow up  Office telephone for problems or questions [phone]  Multiple small nonbleeding AVMs and suspected AVMs cauterized    MyChart Activation    Thank you for requesting access to [employer]. Please follow the instructions below to securely access and download your online medical record. [employer] allows you to send messages to your doctor, view your test results, renew your prescriptions, schedule appointments, and more. How Do I Sign Up? 1. In your internet browser, go to [email or web address]  2. Click on the First Time User? Click Here link in the Sign In box. You will be redirect to the New Member Sign Up page.   3. Enter your [employer] Access Code exactly as it appears below. You will not need to use this code after youve completed the sign-up process. If you do not sign up before the expiration date, you must request a new code. MyChart Access Code: Activation code not generated  Current [employer] Status: Active (This is the date your [employer] access code will )    4. Enter the last four digits of your Social Security Number (xxxx) and Date of Birth (mm/dd/yyyy) as indicated and click Submit. You will be taken to the next sign-up page. 5. Create a [employer] ID. This will be your [employer] login ID and cannot be changed, so think of one that is secure and easy to remember. 6. Create a [employer] password. You can change your password at any time. 7. Enter your Password Reset Question and Answer. This can be used at a later time if you forget your password. 8. Enter your e-mail address. You will receive e-mail notification when new information is available in [address]. 9. Click Sign Up. You can now view and download portions of your medical record. 10. Click the Download Summary menu link to download a portable copy of your medical information. Additional Information    If you have questions, please visit the Frequently Asked Questions section of the [employer] website at [email or web address] Remember, [employer] is NOT to be used for urgent needs. For medical emergencies, dial 911.

## 2019-02-26 NOTE — ANESTHESIA PREPROCEDURE EVALUATION
Anesthetic History PONV Review of Systems / Medical History Patient summary reviewed, nursing notes reviewed and pertinent labs reviewed Pulmonary Neuro/Psych Cardiovascular GI/Hepatic/Renal 
  
GERD Hepatitis: type C Liver disease Comments: AV malformation Endo/Other Other Findings Physical Exam 
 
Airway Mallampati: I 
TM Distance: > 6 cm Neck ROM: normal range of motion Mouth opening: Normal 
 
 Cardiovascular Rhythm: regular Rate: normal 
 
 
 
 Dental 
 
Dentition: Full upper dentures Pulmonary Breath sounds clear to auscultation Abdominal 
 
 
 
 Other Findings Anesthetic Plan ASA: 3 Anesthesia type: general 
 
 
 
 
Induction: Intravenous Anesthetic plan and risks discussed with: Patient

## 2019-02-26 NOTE — PERIOP NOTES

## 2019-02-28 ENCOUNTER — APPOINTMENT (OUTPATIENT)
Dept: GENERAL RADIOLOGY | Age: 74
End: 2019-02-28
Attending: PHYSICIAN ASSISTANT
Payer: MEDICARE

## 2019-02-28 ENCOUNTER — HOSPITAL ENCOUNTER (EMERGENCY)
Age: 74
Discharge: HOME | End: 2019-02-28
Attending: EMERGENCY MEDICINE
Payer: MEDICARE

## 2019-02-28 VITALS
HEART RATE: 88 BPM | HEIGHT: 66 IN | RESPIRATION RATE: 14 BRPM | TEMPERATURE: 98 F | BODY MASS INDEX: 25.55 KG/M2 | OXYGEN SATURATION: 100 % | WEIGHT: 158.95 LBS | SYSTOLIC BLOOD PRESSURE: 142 MMHG | DIASTOLIC BLOOD PRESSURE: 83 MMHG

## 2019-02-28 DIAGNOSIS — R07.89 BURNING CHEST PAIN: ICD-10-CM

## 2019-02-28 DIAGNOSIS — K21.9 GASTROESOPHAGEAL REFLUX DISEASE WITHOUT ESOPHAGITIS: Primary | ICD-10-CM

## 2019-02-28 LAB
ALBUMIN SERPL-MCNC: 4 G/DL (ref 3.5–5)
ALBUMIN/GLOB SERPL: 1.1 {RATIO} (ref 1.1–2.2)
ALP SERPL-CCNC: 54 U/L (ref 45–117)
ALT SERPL-CCNC: 15 U/L (ref 12–78)
ANION GAP SERPL CALC-SCNC: 8 MMOL/L (ref 5–15)
AST SERPL-CCNC: 19 U/L (ref 15–37)
BASOPHILS # BLD: 0 K/UL (ref 0–0.1)
BASOPHILS NFR BLD: 1 % (ref 0–1)
BILIRUB SERPL-MCNC: 0.5 MG/DL (ref 0.2–1)
BUN SERPL-MCNC: 18 MG/DL (ref 6–20)
BUN/CREAT SERPL: 24 (ref 12–20)
CALCIUM SERPL-MCNC: 8.5 MG/DL (ref 8.5–10.1)
CHLORIDE SERPL-SCNC: 107 MMOL/L (ref 97–108)
CK SERPL-CCNC: 106 U/L (ref 26–192)
CO2 SERPL-SCNC: 24 MMOL/L (ref 21–32)
CREAT SERPL-MCNC: 0.74 MG/DL (ref 0.55–1.02)
DIFFERENTIAL METHOD BLD: ABNORMAL
EOSINOPHIL # BLD: 0.2 K/UL (ref 0–0.4)
EOSINOPHIL NFR BLD: 3 % (ref 0–7)
ERYTHROCYTE [DISTWIDTH] IN BLOOD BY AUTOMATED COUNT: 17.8 % (ref 11.5–14.5)
GLOBULIN SER CALC-MCNC: 3.6 G/DL (ref 2–4)
GLUCOSE SERPL-MCNC: 91 MG/DL (ref 65–100)
HCT VFR BLD AUTO: 34.8 % (ref 35–47)
HGB BLD-MCNC: 10.4 G/DL (ref 11.5–16)
IMM GRANULOCYTES # BLD AUTO: 0 K/UL (ref 0–0.04)
IMM GRANULOCYTES NFR BLD AUTO: 0 % (ref 0–0.5)
LYMPHOCYTES # BLD: 1.3 K/UL (ref 0.8–3.5)
LYMPHOCYTES NFR BLD: 24 % (ref 12–49)
MCH RBC QN AUTO: 24.1 PG (ref 26–34)
MCHC RBC AUTO-ENTMCNC: 29.9 G/DL (ref 30–36.5)
MCV RBC AUTO: 80.6 FL (ref 80–99)
MONOCYTES # BLD: 0.4 K/UL (ref 0–1)
MONOCYTES NFR BLD: 8 % (ref 5–13)
NEUTS SEG # BLD: 3.4 K/UL (ref 1.8–8)
NEUTS SEG NFR BLD: 65 % (ref 32–75)
NRBC # BLD: 0 K/UL (ref 0–0.01)
NRBC BLD-RTO: 0 PER 100 WBC
PLATELET # BLD AUTO: 280 K/UL (ref 150–400)
PMV BLD AUTO: 9.9 FL (ref 8.9–12.9)
POTASSIUM SERPL-SCNC: 3.8 MMOL/L (ref 3.5–5.1)
PROT SERPL-MCNC: 7.6 G/DL (ref 6.4–8.2)
RBC # BLD AUTO: 4.32 M/UL (ref 3.8–5.2)
SODIUM SERPL-SCNC: 139 MMOL/L (ref 136–145)
TROPONIN I SERPL-MCNC: <0.05 NG/ML
TROPONIN I SERPL-MCNC: <0.05 NG/ML
WBC # BLD AUTO: 5.3 K/UL (ref 3.6–11)

## 2019-02-28 PROCEDURE — 80053 COMPREHEN METABOLIC PANEL: CPT

## 2019-02-28 PROCEDURE — 82550 ASSAY OF CK (CPK): CPT

## 2019-02-28 PROCEDURE — 99283 EMERGENCY DEPT VISIT LOW MDM: CPT

## 2019-02-28 PROCEDURE — 74011250637 HC RX REV CODE- 250/637: Performed by: PHYSICIAN ASSISTANT

## 2019-02-28 PROCEDURE — 93005 ELECTROCARDIOGRAM TRACING: CPT

## 2019-02-28 PROCEDURE — 84484 ASSAY OF TROPONIN QUANT: CPT

## 2019-02-28 PROCEDURE — 71046 X-RAY EXAM CHEST 2 VIEWS: CPT

## 2019-02-28 PROCEDURE — 36415 COLL VENOUS BLD VENIPUNCTURE: CPT

## 2019-02-28 PROCEDURE — 85025 COMPLETE CBC W/AUTO DIFF WBC: CPT

## 2019-02-28 PROCEDURE — 74011000250 HC RX REV CODE- 250: Performed by: PHYSICIAN ASSISTANT

## 2019-02-28 RX ORDER — MAG HYDROX/ALUMINUM HYD/SIMETH 200-200-20
30 SUSPENSION, ORAL (FINAL DOSE FORM) ORAL EVERY 4 HOURS
Qty: 150 ML | Refills: 0 | Status: SHIPPED | OUTPATIENT
Start: 2019-02-28 | End: 2019-03-07

## 2019-02-28 RX ORDER — RANITIDINE HCL 75 MG
TABLET ORAL
COMMUNITY
End: 2020-08-31

## 2019-02-28 RX ADMIN — LIDOCAINE HYDROCHLORIDE 40 ML: 20 SOLUTION ORAL; TOPICAL at 16:25

## 2019-02-28 NOTE — ED PROVIDER NOTES
EMERGENCY DEPARTMENT HISTORY AND PHYSICAL EXAM 
 
Date: 2/28/2019 Patient Name: Natasha Wilcox History of Presenting Illness Chief Complaint Patient presents with  Chest Pain  
  mid center chest pain Pt had similar episode yesterday relieved by zantac History Provided By: Patient and Patient's  HPI: Natasha Wilcox is a 72 y.o. female with a PMH of duodenal AVM, Hep C, barrets esophagus, panic attacks, anxiety, GERD who presents with cc of non radiating, sternal burning and tightening that started this morning around 9am.  Pt states she had similar episode yesterday that resolved after she took zantac. Pt states she had ribs last night and also had coffee this morning around 4-5 and the chest pain started at few hours later. Pt states she took a zantac again and the pain has gotten better but has not resolved. She denies any palpitations,  abd pain, dizziness, nausea, syncope or sob prior, during or after the onset of the chest pain. She specifically denies any left arm tingling, jaw numbness, confusion, ams,  fevers, chills, nausea, vomiting, diaphoresis, shortness of breath, headache, rash, diarrhea, sweating or weight loss. All other ROS negative at this time Pt is in no acute distress and is speaking in full sentences PCP: Juanita Marcus MD 
 
Current Outpatient Medications Medication Sig Dispense Refill  raNITIdine (ZANTAC) 75 mg tab Take  by mouth nightly.  alum-mag hydroxide-simeth (MYLANTA) 200-200-20 mg/5 mL susp Take 30 mL by mouth every four (4) hours for 7 days. 150 mL 0  
 ferrous sulfate (SLOW FE PO) Take 45 mg by mouth daily.  pantoprazole (PROTONIX) 40 mg tablet Take 40 mg by mouth daily.  ACETAMINOPHEN EXTRA STRENGTH PO Take  by mouth as needed.  PARoxetine (PAXIL) 20 mg tablet TAKE 1 TABLET EVERY DAY 90 Tab 3  
 meclizine (ANTIVERT) 25 mg tablet Take 25 mg by mouth daily.  ondansetron (ZOFRAN ODT) 4 mg disintegrating tablet Take 1 Tab by mouth every eight (8) hours as needed for Nausea. 10 Tab 0  
 therapeutic multivitamin (THERAGRAN) tablet Take 1 Tab by mouth daily. 30 Tab 0  
 diazepam (VALIUM) 5 mg tablet Take 1 Tab by mouth every six (6) hours as needed for Anxiety. 30 Tab 0  
 minoxidil (ROGAINE) 2 % external solution Apply  to affected area daily. Past History Past Medical History: 
Past Medical History:  
Diagnosis Date  GERD (gastroesophageal reflux disease)  GI bleed Duodenal AVMs  Ill-defined condition   
 hepatitis C  
 Ill-defined condition   
 small gallstones  Ill-defined condition Solis's esophagus  Irregular heart beat   
 evaluated and thought to be panic attacks  Nausea & vomiting \"sick from not eating\"/gets something for N/V prior to anesthesia  Psychiatric disorder   
 anxiety Past Surgical History: 
Past Surgical History:  
Procedure Laterality Date  BREAST SURGERY PROCEDURE UNLISTED    
 lump removed - benign lt  COLONOSCOPY N/A 2016 COLONOSCOPY performed by Kamilah Peterson [facility] at [facility]  
 HX ENDOSCOPY    
 HX HYSTERECTOMY    
 total  
 HX ORTHOPAEDIC    
 bone fusion in neck  HX OTHER SURGICAL    
 colonoscopy - multiple - one polyp/EGD  HX OTHER SURGICAL    
 bone removed from hip to place in neck Family History: 
Family History Problem Relation Age of Onset  Liver Disease Father   
     cirrhosis  Emphysema Father  Other Father   
     hardening of arteries  Diabetes Sister  Heart Disease Brother  Cancer Brother   
     prostate Social History: 
Social History Tobacco Use  Smoking status: Former Smoker Packs/day: 1.00 Years: 30.00 Pack years: 30.00 Last attempt to quit: 1994 Years since quittin.9  Smokeless tobacco: Never Used  Tobacco comment: former cigarette smoker Substance Use Topics  Alcohol use: Yes Alcohol/week: 3.6 oz Types: 6 Cans of beer per week  Drug use: No  
 
 
Allergies: Allergies Allergen Reactions  Ciprofloxacin Other (comments) Hand pain  Methylprednisolone Other (comments) Dizziness and felt disoriented  Naprosyn [Naproxen] Vertigo Unsure of reaction.  Prevacid [Lansoprazole] Other (comments) Ulcers in mouth.  Sulfa (Sulfonamide Antibiotics) Unknown (comments) As child. Unsure of reaction.  Tegretol [Carbamazepine] Hives Review of Systems Review of Systems Constitutional: Negative. Negative for chills and fever. HENT: Negative. Eyes: Negative. Respiratory: Negative. Negative for choking, chest tightness, shortness of breath, wheezing and stridor. Cardiovascular: Positive for chest pain (burning ). Gastrointestinal: Negative. Negative for abdominal pain, diarrhea, nausea and vomiting. Endocrine: Negative. Genitourinary: Negative. Musculoskeletal: Negative. Skin: Negative. Allergic/Immunologic: Negative. Neurological: Negative. Negative for dizziness, facial asymmetry, light-headedness, numbness and headaches. Hematological: Negative. Psychiatric/Behavioral: Negative. Negative for confusion. All other systems reviewed and are negative. Physical Exam  
 
Vitals:  
 02/28/19 1418 BP: 142/83 Pulse: 88 Resp: 14 Temp: 98 °F (36.7 °C) SpO2: 100% Weight: 72.1 kg (158 lb 15.2 oz) Height: 5' 6\" (1.676 m) Physical Exam  
Constitutional: She is oriented to person, place, and time. She appears well-developed and well-nourished. No distress. HENT:  
Head: Normocephalic and atraumatic. Right Ear: External ear normal.  
Left Ear: External ear normal.  
Nose: Nose normal.  
Eyes: Conjunctivae and EOM are normal. Pupils are equal, round, and reactive to light. Neck: Normal range of motion. Neck supple. No tracheal deviation present. Cardiovascular: Normal rate, regular rhythm, normal heart sounds and intact distal pulses. Pulmonary/Chest: Effort normal and breath sounds normal. No respiratory distress. She has no wheezes. She exhibits no tenderness. Abdominal: Soft. Bowel sounds are normal. She exhibits no distension. There is no tenderness. There is no rebound, no CVA tenderness, no tenderness at McBurney's point and negative Hicks's sign. Musculoskeletal: Normal range of motion. She exhibits no edema, tenderness or deformity. Lymphadenopathy:  
  She has no cervical adenopathy. Neurological: She is alert and oriented to person, place, and time. She has normal reflexes. She displays normal reflexes. No cranial nerve deficit. She exhibits normal muscle tone. Coordination normal.  
Skin: Skin is warm and dry. She is not diaphoretic. No pallor. Psychiatric: She has a normal mood and affect. Her behavior is normal. Judgment and thought content normal.  
Nursing note and vitals reviewed. Diagnostic Study Results Labs - Recent Results (from the past 12 hour(s)) EKG, 12 LEAD, INITIAL Collection Time: 02/28/19  2:23 PM  
Result Value Ref Range Ventricular Rate 67 BPM  
 Atrial Rate 67 BPM  
 P-R Interval 154 ms QRS Duration 76 ms  
 Q-T Interval 406 ms QTC Calculation (Bezet) 429 ms Calculated P Axis 41 degrees Calculated R Axis 1 degrees Calculated T Axis 67 degrees Diagnosis Normal sinus rhythm with sinus arrhythmia Possible Left atrial enlargement When compared with ECG of 07-DEC-2018 13:41, No significant change was found CBC WITH AUTOMATED DIFF Collection Time: 02/28/19  2:29 PM  
Result Value Ref Range WBC 5.3 3.6 - 11.0 K/uL  
 RBC 4.32 3.80 - 5.20 M/uL  
 HGB 10.4 (L) 11.5 - 16.0 g/dL HCT 34.8 (L) 35.0 - 47.0 % MCV 80.6 80.0 - 99.0 FL  
 MCH 24.1 (L) 26.0 - 34.0 PG  
 MCHC 29.9 (L) 30.0 - 36.5 g/dL  
 RDW 17.8 (H) 11.5 - 14.5 % PLATELET 505 903 - 374 K/uL MPV 9.9 8.9 - 12.9 FL  
 NRBC 0.0 0  WBC ABSOLUTE NRBC 0.00 0.00 - 0.01 K/uL NEUTROPHILS 65 32 - 75 % LYMPHOCYTES 24 12 - 49 % MONOCYTES 8 5 - 13 % EOSINOPHILS 3 0 - 7 % BASOPHILS 1 0 - 1 % IMMATURE GRANULOCYTES 0 0.0 - 0.5 % ABS. NEUTROPHILS 3.4 1.8 - 8.0 K/UL  
 ABS. LYMPHOCYTES 1.3 0.8 - 3.5 K/UL  
 ABS. MONOCYTES 0.4 0.0 - 1.0 K/UL  
 ABS. EOSINOPHILS 0.2 0.0 - 0.4 K/UL  
 ABS. BASOPHILS 0.0 0.0 - 0.1 K/UL  
 ABS. IMM. GRANS. 0.0 0.00 - 0.04 K/UL  
 DF AUTOMATED METABOLIC PANEL, COMPREHENSIVE Collection Time: 02/28/19  2:29 PM  
Result Value Ref Range Sodium 139 136 - 145 mmol/L Potassium 3.8 3.5 - 5.1 mmol/L Chloride 107 97 - 108 mmol/L  
 CO2 24 21 - 32 mmol/L Anion gap 8 5 - 15 mmol/L Glucose 91 65 - 100 mg/dL BUN 18 6 - 20 MG/DL Creatinine 0.74 0.55 - 1.02 MG/DL  
 BUN/Creatinine ratio 24 (H) 12 - 20 GFR est AA >60 >60 ml/min/1.73m2 GFR est non-AA >60 >60 ml/min/1.73m2 Calcium 8.5 8.5 - 10.1 MG/DL Bilirubin, total 0.5 0.2 - 1.0 MG/DL  
 ALT (SGPT) 15 12 - 78 U/L  
 AST (SGOT) 19 15 - 37 U/L Alk. phosphatase 54 45 - 117 U/L Protein, total 7.6 6.4 - 8.2 g/dL Albumin 4.0 3.5 - 5.0 g/dL Globulin 3.6 2.0 - 4.0 g/dL A-G Ratio 1.1 1.1 - 2.2 CK W/ REFLX CKMB Collection Time: 02/28/19  2:29 PM  
Result Value Ref Range  26 - 192 U/L  
TROPONIN I Collection Time: 02/28/19  2:29 PM  
Result Value Ref Range Troponin-I, Qt. <0.05 <0.05 ng/mL TROPONIN I Collection Time: 02/28/19  4:44 PM  
Result Value Ref Range Troponin-I, Qt. <0.05 <0.05 ng/mL Radiologic Studies -  
XR CHEST PA LAT Final Result IMPRESSION: No acute findings CT Results  (Last 48 hours) None CXR Results  (Last 48 hours) 02/28/19 1556  XR CHEST PA LAT Final result Impression:  IMPRESSION: No acute findings Narrative:  EXAM: XR CHEST PA LAT INDICATION: pneumonia COMPARISON: 8/6/2016. FINDINGS: PA and lateral radiographs of the chest demonstrate clear lungs and  
pleural margins. Cardiac, mediastinal and hilar contours are normal. Mild  
thoracic spine degenerative changes are shown. Medical Decision Making I am the first provider for this patient. I reviewed the vital signs, available nursing notes, past medical history, past surgical history, family history and social history. Vital Signs-Reviewed the patient's vital signs. Records Reviewed: Nursing Notes, Old Medical Records, Previous Radiology Studies and Previous Laboratory Studies Disposition: 
Discharge DISCHARGE NOTE:  
Care plan outlined and precautions discussed. Patient has no new complaints, changes, or physical findings. Results of visit were reviewed with the patient. All medications were reviewed with the patient; will d/c home. All of pt's questions and concerns were addressed. Patient was instructed and agrees to follow up with pcp, as well as to return to the ED upon further deterioration. Patient is ready to go home. Follow-up Information Follow up With Specialties Details Why Contact Info Madelaine Baker MD Family Practice Schedule an appointment as soon as possible for a visit in 3 days If symptoms worsen [address] [place] [address] 
[phone] Current Discharge Medication List  
  
START taking these medications Details  
alum-mag hydroxide-simeth (MYLANTA) 200-200-20 mg/5 mL susp Take 30 mL by mouth every four (4) hours for 7 days. Qty: 150 mL, Refills: 0 Provider Notes (Medical Decision Making):  
Patient presents with CP. DDx:  ACS, Aortic dissection, PNA, PE, PTX, pericarditis, myocarditis, GERD, costochondritis, anxiety. Concerned for GERD given the HPI and Physical exam. Will obtain labs, CXR, EKG and get Cardiology Consult PRN. Will get a two hour trop as pts symptoms started around 9am  
 
4:43 PM pt states she is feeling better after the gi cocktail and her pain has resolved. She is in no distress and is eating crackers - I have re-examined the patient and the patient denies chest pain on re-examination. The patient has had onset of chest pain greater than 8 hours and one negative set of cardiac enzymes or 2 negative sets of cardiac enzymes in the ER during this visit. The diagnosis, follow up, return instructions, test results, x-rays and medications have been discussed and reviewed with the patient. The patient has been given the opportunity to ask questions. The patient  expresses understanding of the diagnosis, follow-up and return instructions. The patient agrees to follow up with primary care or cardiology as directed and to return immediately if the chest pain worsens. The patient expresses understanding that although cardiac testing at this time is negative, a cardiac problem could still be present and that a follow-up appointment for further evaluation and risk factor modification is necessary to complete the evaluation of this complaint. Procedures: 
Procedures Diagnosis Clinical Impression:  
1. Gastroesophageal reflux disease without esophagitis 2. Burning chest pain

## 2019-02-28 NOTE — DISCHARGE INSTRUCTIONS

## 2019-02-28 NOTE — ROUTINE PROCESS
TAHIRA Boyle reviewed discharge instructions with the patient. The patient and spouse verbalized understanding. Alert and stable to walk at discharge

## 2019-03-02 LAB
ATRIAL RATE: 67 BPM
CALCULATED P AXIS, ECG09: 41 DEGREES
CALCULATED R AXIS, ECG10: 1 DEGREES
CALCULATED T AXIS, ECG11: 67 DEGREES
DIAGNOSIS, 93000: NORMAL
P-R INTERVAL, ECG05: 154 MS
Q-T INTERVAL, ECG07: 406 MS
QRS DURATION, ECG06: 76 MS
QTC CALCULATION (BEZET), ECG08: 429 MS
VENTRICULAR RATE, ECG03: 67 BPM

## 2019-08-27 ENCOUNTER — HOSPITAL ENCOUNTER (OUTPATIENT)
Dept: MAMMOGRAPHY | Age: 74
Discharge: HOME | End: 2019-08-27
Attending: FAMILY MEDICINE
Payer: MEDICARE

## 2019-08-27 DIAGNOSIS — Z12.39 BREAST SCREENING: ICD-10-CM

## 2019-08-27 PROCEDURE — 77067 SCR MAMMO BI INCL CAD: CPT

## 2019-09-18 RX ORDER — PAROXETINE HYDROCHLORIDE 20 MG/1
TABLET, FILM COATED ORAL
Qty: 90 TAB | Refills: 3 | Status: SHIPPED | OUTPATIENT
Start: 2019-09-18

## 2020-07-22 ENCOUNTER — HOSPITAL ENCOUNTER (OUTPATIENT)
Dept: MAMMOGRAPHY | Age: 75
Discharge: HOME | End: 2020-07-22
Attending: FAMILY MEDICINE
Payer: MEDICARE

## 2020-07-22 DIAGNOSIS — N63.0 BREAST MASS: ICD-10-CM

## 2020-07-22 DIAGNOSIS — N63.0 BREAST LUMP: ICD-10-CM

## 2020-07-22 DIAGNOSIS — N64.4 BREAST PAIN: ICD-10-CM

## 2020-07-22 PROCEDURE — 77066 DX MAMMO INCL CAD BI: CPT

## 2020-07-22 PROCEDURE — 76642 ULTRASOUND BREAST LIMITED: CPT

## 2020-08-31 NOTE — PERIOP NOTES
[facility]  Ambulatory Surgery Unit  Pre-operative Instructions for Endo Procedures    Procedure Date  9/10            Tentative Arrival Time 0645      1. On the day of your procedure, please report to the Ambulatory Surgery Unit Registration Desk and sign in at your designated time. The Ambulatory Surgery Unit is located in [facility] on the [facility] side of the [facility] across from the [address] [place] [address]. Please have all of your health insurance cards and a photo ID. 2. You must have someone with you to drive you home, as you should not drive a car for 24 hours following anesthesia. Please make arrangements for a responsible adult friend or family member to stay with you for at least the first 24 hours after your procedure. 3. Do not have anything to eat or drink (including water, gum, mints, coffee, juice) after 11:59 PM 9/9. This may not apply to medications prescribed by your physician. (Please note below the special instructions with medications to take the morning of your procedure.)    4. If applicable, follow the clear liquid diet and bowel prep instructions provided by your physician's office. If you do not have this information, or have any questions, please contact your physician's office. 5. We recommend you do not drink any alcoholic beverages for 24 hours before and after your procedure. 6. Contact your surgeons office for instructions on the following medications: non-steroidal anti-inflammatory drugs (i.e. Advil, Aleve), vitamins, and supplements. (Some surgeons will want you to stop these medications prior to surgery and others may allow you to take them)   **If you are currently taking Plavix, Coumadin, Aspirin and/or other blood-thinning agents, contact your surgeon for instructions. ** Your surgeon will partner with the physician prescribing these medications to determine if it is safe to stop or if you need to continue taking.  Please do not stop taking these medications without instructions from your surgeon. 7. In an effort to help prevent surgical site infection, we ask that you shower with an anti-bacterial soap (i.e. Dial or Safeguard) on the morning of your procedure. Do not apply any lotions, powders, or deodorants after showering. 8. Wear comfortable clothes. Wear glasses instead of contacts. Do not bring any jewelry or money (other than copays or fees as instructed). Do not wear make-up, particularly mascara, the morning of your procedure. Wear your hair loose or down, no ponytails, buns, dario pins or clips. All body piercings must be removed. 9. You should understand that if you do not follow these instructions your procedure may be cancelled. If your physical condition changes (i.e. fever, cold or flu) please contact your surgeon as soon as possible. 10. It is important that you be on time. If a situation occurs where you may be late, or if you have any questions or problems, please call [phone]. 11. Your procedure time may be subject to change. You will receive a phone call the day prior to confirm your arrival time. Special Instructions: Take all medications and inhalers, as prescribed, on the morning of surgery with a sip of water EXCEPT: n/a      Insulin Dependent Diabetic patients: Take your diabetic medications as prescribed the day before surgery. Hold all diabetic medications the day of surgery. If you are scheduled to arrive for surgery after 8:00 AM, and your AM blood sugar is >200, please call Ambulatory Surgery. I understand a pre-operative phone call will be made to verify my procedure time. In the event that I am not available, I give permission for a message to be left on my answering service and/or with another person?       yes         ___________________      ___________________      ___________________  (Signature of Patient)          (Witness)                   (Date and Time)

## 2020-09-06 ENCOUNTER — HOSPITAL ENCOUNTER (OUTPATIENT)
Dept: PREADMISSION TESTING | Age: 75
Discharge: HOME | End: 2020-09-06
Payer: MEDICARE

## 2020-09-06 PROCEDURE — 87635 SARS-COV-2 COVID-19 AMP PRB: CPT

## 2020-09-07 LAB
HEALTH STATUS, XMCV2T: NORMAL
SARS-COV-2, COV2NT: NOT DETECTED
SOURCE, COVRS: NORMAL
SPECIMEN SOURCE, FCOV2M: NORMAL
SPECIMEN TYPE, XMCV1T: NORMAL

## 2020-09-09 ENCOUNTER — ANESTHESIA EVENT (OUTPATIENT)
Dept: SURGERY | Age: 75
End: 2020-09-09
Payer: MEDICARE

## 2020-09-10 ENCOUNTER — HOSPITAL ENCOUNTER (OUTPATIENT)
Age: 75
Setting detail: OUTPATIENT SURGERY
Discharge: HOME | End: 2020-09-10
Attending: SPECIALIST | Admitting: SPECIALIST
Payer: MEDICARE

## 2020-09-10 ENCOUNTER — ANESTHESIA (OUTPATIENT)
Dept: SURGERY | Age: 75
End: 2020-09-10
Payer: MEDICARE

## 2020-09-10 VITALS
RESPIRATION RATE: 14 BRPM | BODY MASS INDEX: 23.86 KG/M2 | OXYGEN SATURATION: 97 % | DIASTOLIC BLOOD PRESSURE: 67 MMHG | HEART RATE: 60 BPM | WEIGHT: 152 LBS | TEMPERATURE: 97.8 F | SYSTOLIC BLOOD PRESSURE: 143 MMHG | HEIGHT: 67 IN

## 2020-09-10 PROCEDURE — 76060000073 HC AMB SURG ANES FIRST 0.5 HR: Performed by: SPECIALIST

## 2020-09-10 PROCEDURE — C1730 CATH, EP, 19 OR FEW ELECT: HCPCS | Performed by: SPECIALIST

## 2020-09-10 PROCEDURE — 76030000002 HC AMB SURG OR TIME FIRST 0.: Performed by: SPECIALIST

## 2020-09-10 PROCEDURE — 74011250636 HC RX REV CODE- 250/636: Performed by: NURSE ANESTHETIST, CERTIFIED REGISTERED

## 2020-09-10 PROCEDURE — 74011000250 HC RX REV CODE- 250: Performed by: NURSE ANESTHETIST, CERTIFIED REGISTERED

## 2020-09-10 PROCEDURE — 77030021352 HC CBL LD SYS DISP COVD -B: Performed by: SPECIALIST

## 2020-09-10 PROCEDURE — 88305 TISSUE EXAM BY PATHOLOGIST: CPT

## 2020-09-10 PROCEDURE — 76210000046 HC AMBSU PH II REC FIRST 0.5 HR: Performed by: SPECIALIST

## 2020-09-10 PROCEDURE — 76210000040 HC AMBSU PH I REC FIRST 0.5 HR: Performed by: SPECIALIST

## 2020-09-10 PROCEDURE — 74011250636 HC RX REV CODE- 250/636: Performed by: ANESTHESIOLOGY

## 2020-09-10 PROCEDURE — 77030019988 HC FCPS ENDOSC DISP BSC -B: Performed by: SPECIALIST

## 2020-09-10 RX ORDER — ONDANSETRON 2 MG/ML
INJECTION INTRAMUSCULAR; INTRAVENOUS
Status: DISCONTINUED
Start: 2020-09-10 | End: 2020-09-10 | Stop reason: HOSPADM

## 2020-09-10 RX ORDER — ONDANSETRON 2 MG/ML
4 INJECTION INTRAMUSCULAR; INTRAVENOUS AS NEEDED
Status: DISCONTINUED | OUTPATIENT
Start: 2020-09-10 | End: 2020-09-10 | Stop reason: HOSPADM

## 2020-09-10 RX ORDER — SODIUM CHLORIDE, SODIUM LACTATE, POTASSIUM CHLORIDE, CALCIUM CHLORIDE 600; 310; 30; 20 MG/100ML; MG/100ML; MG/100ML; MG/100ML
25 INJECTION, SOLUTION INTRAVENOUS CONTINUOUS
Status: DISCONTINUED | OUTPATIENT
Start: 2020-09-10 | End: 2020-09-10 | Stop reason: HOSPADM

## 2020-09-10 RX ORDER — SODIUM CHLORIDE 0.9 % (FLUSH) 0.9 %
5-40 SYRINGE (ML) INJECTION AS NEEDED
Status: DISCONTINUED | OUTPATIENT
Start: 2020-09-10 | End: 2020-09-10 | Stop reason: HOSPADM

## 2020-09-10 RX ORDER — SODIUM CHLORIDE 0.9 % (FLUSH) 0.9 %
5-40 SYRINGE (ML) INJECTION EVERY 8 HOURS
Status: DISCONTINUED | OUTPATIENT
Start: 2020-09-10 | End: 2020-09-10 | Stop reason: HOSPADM

## 2020-09-10 RX ORDER — DEXTROMETHORPHAN/PSEUDOEPHED 2.5-7.5/.8
1.2 DROPS ORAL
Status: DISCONTINUED | OUTPATIENT
Start: 2020-09-10 | End: 2020-09-10 | Stop reason: HOSPADM

## 2020-09-10 RX ORDER — DIPHENHYDRAMINE HYDROCHLORIDE 50 MG/ML
12.5 INJECTION, SOLUTION INTRAMUSCULAR; INTRAVENOUS AS NEEDED
Status: DISCONTINUED | OUTPATIENT
Start: 2020-09-10 | End: 2020-09-10 | Stop reason: HOSPADM

## 2020-09-10 RX ORDER — FENTANYL CITRATE 50 UG/ML
25 INJECTION, SOLUTION INTRAMUSCULAR; INTRAVENOUS
Status: DISCONTINUED | OUTPATIENT
Start: 2020-09-10 | End: 2020-09-10 | Stop reason: HOSPADM

## 2020-09-10 RX ORDER — LIDOCAINE HYDROCHLORIDE 20 MG/ML
INJECTION, SOLUTION EPIDURAL; INFILTRATION; INTRACAUDAL; PERINEURAL AS NEEDED
Status: DISCONTINUED | OUTPATIENT
Start: 2020-09-10 | End: 2020-09-10 | Stop reason: HOSPADM

## 2020-09-10 RX ORDER — ONDANSETRON 2 MG/ML
4 INJECTION INTRAMUSCULAR; INTRAVENOUS ONCE
Status: COMPLETED | OUTPATIENT
Start: 2020-09-10 | End: 2020-09-10

## 2020-09-10 RX ORDER — PROPOFOL 10 MG/ML
INJECTION, EMULSION INTRAVENOUS AS NEEDED
Status: DISCONTINUED | OUTPATIENT
Start: 2020-09-10 | End: 2020-09-10 | Stop reason: HOSPADM

## 2020-09-10 RX ORDER — LIDOCAINE HYDROCHLORIDE 10 MG/ML
0.1 INJECTION, SOLUTION EPIDURAL; INFILTRATION; INTRACAUDAL; PERINEURAL AS NEEDED
Status: DISCONTINUED | OUTPATIENT
Start: 2020-09-10 | End: 2020-09-10 | Stop reason: HOSPADM

## 2020-09-10 RX ORDER — SODIUM CHLORIDE 9 MG/ML
50 INJECTION, SOLUTION INTRAVENOUS CONTINUOUS
Status: DISCONTINUED | OUTPATIENT
Start: 2020-09-10 | End: 2020-09-10 | Stop reason: HOSPADM

## 2020-09-10 RX ADMIN — LIDOCAINE HYDROCHLORIDE 100 MG: 20 INJECTION, SOLUTION INTRAVENOUS at 08:26

## 2020-09-10 RX ADMIN — PROPOFOL 60 MG: 10 INJECTION, EMULSION INTRAVENOUS at 08:27

## 2020-09-10 RX ADMIN — SODIUM CHLORIDE, SODIUM LACTATE, POTASSIUM CHLORIDE, AND CALCIUM CHLORIDE 25 ML/HR: 600; 310; 30; 20 INJECTION, SOLUTION INTRAVENOUS at 06:54

## 2020-09-10 RX ADMIN — PROPOFOL 50 MG: 10 INJECTION, EMULSION INTRAVENOUS at 08:29

## 2020-09-10 RX ADMIN — PROPOFOL 50 MG: 10 INJECTION, EMULSION INTRAVENOUS at 08:32

## 2020-09-10 RX ADMIN — ONDANSETRON 4 MG: 2 INJECTION INTRAMUSCULAR; INTRAVENOUS at 07:21

## 2020-09-10 NOTE — ANESTHESIA POSTPROCEDURE EVALUATION
Procedure(s):  UPPER ENDOSCOPY  (EGD)  WITH BIOPSIES. general, total IV anesthesia    Anesthesia Post Evaluation      Multimodal analgesia: multimodal analgesia not used between 6 hours prior to anesthesia start to PACU discharge  Patient location during evaluation: PACU  Patient participation: complete - patient participated  Level of consciousness: awake and alert  Pain score: 0  Airway patency: patent  Anesthetic complications: no  Cardiovascular status: acceptable  Respiratory status: acceptable  Hydration status: acceptable  Post anesthesia nausea and vomiting:  none  Final Post Anesthesia Temperature Assessment:  Normothermia (36.0-37.5 degrees C)      INITIAL Post-op Vital signs:   Vitals Value Taken Time   /78 9/10/2020  8:50 AM   Temp 36.6 °C (97.8 °F) 9/10/2020  8:50 AM   Pulse 55 9/10/2020  8:58 AM   Resp 14 9/10/2020  8:58 AM   SpO2 100 % 9/10/2020  8:58 AM   Vitals shown include unvalidated device data.

## 2020-09-10 NOTE — DISCHARGE INSTRUCTIONS
Joey Lira  [id number]  1945    EGD DISCHARGE INSTRUCTIONS  Discomfort:  Sore throat- throat lozenges or warm salt water gargle  redness at IV site- apply warm compress to area; if redness or soreness persist- contact your physician  Gaseous discomfort- walking, belching will help relieve any discomfort  You may not operate a vehicle for 12 hours  You may not engage in an occupation involving machinery or appliances for rest of today. You may not drink alcoholic beverages for at least 12 hours  Avoid making any critical decisions for at least 24 hour  DIET  You may resume your regular diet - however -  remember your colon is empty and a heavy meal will produce gas. Avoid these foods:  vegetables, fried / greasy foods, carbonated drinks  MEDICATIONS        Regarding Aspirin or Nonsteroidal medications specifically, please see below. ACTIVITY  You may resume your normal daily activities. Spend the remainder of the day resting -  avoid any strenuous activity. CALL M.D. ANY SIGN OF   Increasing pain, nausea, vomiting  Abdominal distension (swelling)  New increased bleeding (oral or rectal)  Fever (chills)  Pain in chest area  Bloody discharge from nose or mouth  Shortness of breath    ONLY  Tylenol as needed for pain. Follow-up Instructions:   Call Dr. Olya Geiger for results of procedure / biopsy in 4-5 days at telephone #  [phone]              Repeat EGD in 3 years          DO NOT TAKE SLEEPING MEDICATIONS OR ANTIANXIETY [address] [place] [address]. CPAP PATIENTS BE SURE TO WEAR MACHINE WHENEVER NAPPING OR SLEEPING.     DISCHARGE SUMMARY from Nurse    The following personal items collected during your admission are returned to you:   Dental Appliance: Dental Appliances: Uppers(PACU)  Vision: Visual Aid: Glasses, At home  Hearing Aid:    Jewelry:    Clothing:    Other Valuables:    Valuables sent to safe:        PATIENT INSTRUCTIONS:    Anesthesia Discharge Instructions for Procedural Area requiring Sedation (MAC Anesthesia, Cath Lab, Endo and Radiology): You have been given medications during your procedure that may affect your memory and mental judgement for the next 24 hours. During this time frame for your safety, please follow the instructions listed below :    Have a responsible adult to drive you home and be with you for at least 12 hours.  Rest today and resume normal activities tomorrow.  Start with a soft bland diet and advance as tolerated to your recommended diet.  Do not drive any motor vehicle or operate mechanical or electrical equipment prior to [employer].  Avoid making critical decisions or signing legal documents prior to 6am tomorrow.  Do not drink alcohol prior to 6am tomorrow.  If you have sleep apnea and you plan to go home and take a nap, please use your CPAP machine not only at bedtime, but also while napping for 24 hours.  If you notice any redness or swelling on parts of your body where IV medications were given, place a warm wet washcloth over the area for 20 minutes at a time until the redness or swelling goes away. If you still have redness or swelling after 2-3 days, please call us. · You will receive a Post Operative Call from one of the Recovery Room Nurses on the day after your surgery to check on you. It is very important for us to know how you are recovering after your surgery. If you have an issue or need to speak with someone, please call your surgeon, do not wait for the post operative call. · You may receive an e-mail or letter in the mail from [employer] regarding your experience with us in the Ambulatory Surgery Unit. Your feedback is valuable to us and we appreciate your participation in the survey. · We wish you a speedy recovery ? What to do at Home:      *  Please give a list of your current medications to your Primary Care Provider.     * Please update this list whenever your medications are discontinued, doses are      changed, or new medications (including over-the-counter products) are added. *  Please carry medication information at all times in case of emergency situations. If you have not received your influenza and/or pneumococcal vaccine, please follow up with your primary care physician. The discharge information has been reviewed with the patient and caregiver. The patient and caregiver verbalized understanding.

## 2020-09-10 NOTE — H&P
Gastroenterology Outpatient History and Physical    Patient: Lupe Delcid    Physician: Mc Messina MD    Vital Signs: Blood pressure 146/77, pulse 66, temperature 98 °F (36.7 °C), resp. rate 16, height 5' 6.5\" (1.689 m), weight 68.9 kg (152 lb), SpO2 98 %, not currently breastfeeding. Allergies: Allergies   Allergen Reactions    Ciprofloxacin Other (comments)     Hand pain    Methylprednisolone Other (comments)     Dizziness and felt disoriented    Naprosyn [Naproxen] Vertigo     Unsure of reaction.  Prevacid [Lansoprazole] Other (comments)     Ulcers in mouth.  Sulfa (Sulfonamide Antibiotics) Unknown (comments)     As child. Unsure of reaction.  Tegretol [Carbamazepine] Hives       Chief Complaint: Solis's Esophagus    History of Present Illness: 69 yo WF for EGD for surveillance for Solis's esophagus.     Justification for Procedure: above    History:  Past Medical History:   Diagnosis Date    GERD (gastroesophageal reflux disease)     GI bleed     Duodenal AVMs    Ill-defined condition     hepatitis C - treated no longer detected in blood    Ill-defined condition     small gallstones    Ill-defined condition     Solis's esophagus    Irregular heart beat     evaluated and thought to be panic attacks    Nausea & vomiting     \"sick from not eating\"/gets something for N/V prior to anesthesia    Psychiatric disorder     anxiety      Past Surgical History:   Procedure Laterality Date    BREAST SURGERY PROCEDURE UNLISTED Right     lump removed - benign lt    COLONOSCOPY N/A 8/24/2016    COLONOSCOPY performed by Mc Messina MD at [facility] ENDOSCOPY    HX BREAST BIOPSY Right Years ago    Benign surgical bx yrs ago    HX ENDOSCOPY      HX HYSTERECTOMY      total    HX ORTHOPAEDIC  1997    bone fusion in neck    HX OTHER SURGICAL      colonoscopy - multiple - one polyp/EGD    HX OTHER SURGICAL      bone removed from hip to place in neck      Social History     Socioeconomic History    Marital status:      Spouse name: Not on file    Number of children: Not on file    Years of education: Not on file    Highest education level: Not on file   Tobacco Use    Smoking status: Former Smoker     Packs/day: 1.00     Years: 30.00     Pack years: 30.00     Last attempt to quit: 1994     Years since quittin.4    Smokeless tobacco: Never Used    Tobacco comment: former cigarette smoker   Substance and Sexual Activity    Alcohol use: Yes     Alcohol/week: 7.0 standard drinks     Types: 7 Cans of beer per week    Drug use: No    Sexual activity: Never   Social History Narrative     at [employer]. Lives alone      Family History   Problem Relation Age of Onset    Liver Disease Father         cirrhosis    Emphysema Father     Other Father         hardening of arteries    Diabetes Sister     Heart Disease Brother     Cancer Brother         prostate       Medications:   Prior to Admission medications    Medication Sig Start Date End Date Taking? Authorizing Provider   PARoxetine (PAXIL) 20 mg tablet TAKE 1 TABLET EVERY DAY 19  Yes Davy Joy MD   ferrous sulfate (SLOW FE PO) Take 45 mg by mouth daily. Yes Provider, Historical   pantoprazole (PROTONIX) 40 mg tablet Take 40 mg by mouth daily. Yes Provider, Historical   ACETAMINOPHEN EXTRA STRENGTH PO Take 1,000 mg by mouth as needed for Pain. Yes Provider, Historical   meclizine (ANTIVERT) 25 mg tablet Take 25 mg by mouth daily. Yes Provider, Historical   therapeutic multivitamin (THERAGRAN) tablet Take 1 Tab by mouth daily. 16  Yes Toi Dexter MD   diazepam (VALIUM) 5 mg tablet Take 1 Tab by mouth every six (6) hours as needed for Anxiety. Patient taking differently: Take 5 mg by mouth every six (6) hours as needed for Anxiety.  Patient states only takes 1/2 pill only when needed and usually only takes like once or twice a year 12  Yes Soniya George MD   minoxidil (ROGAINE) 2 % external solution Apply 1 mL to affected area daily. Yes Provider, Historical   ondansetron (ZOFRAN ODT) 4 mg disintegrating tablet Take 1 Tab by mouth every eight (8) hours as needed for Nausea. 5/27/16   Nghia Bro MD       Physical Exam:   General: alert, no distress   HEENT: Head: Normocephalic, no lesions, without obvious abnormality.    Heart: regular rate and rhythm, S1, S2 normal, no murmur, click, rub or gallop   Lungs: chest clear, no wheezing, rales, normal symmetric air entry   Abdominal: soft, NT/ND +BS   Neurological: Grossly normal   Extremities: extremities normal, atraumatic, no cyanosis or edema     Findings/Diagnosis: Solis's Esophagus    Plan of Care/Planned Procedure: EGD

## 2020-09-10 NOTE — PERIOP NOTES
Christopher Lira  1945  [id number]    Situation:  Verbal report given from: RAY Khan CRNA and GENI Buckley RN  Procedure: Procedure(s):  UPPER ENDOSCOPY  (EGD)  WITH BIOPSIES    Background:    Preoperative diagnosis: MCINTOSH'S ESOPHAGUS, SHORT SEGMENT    Postoperative diagnosis: MCINTOSH'S ESOPHAGUS,     :  Dr. Barba Buerger    Assistant(s): Circ-1: North Booth RN  Circ-2: Alexx DE JESUS  Scrub Tech-1: Analy Zapien    Specimens:   ID Type Source Tests Collected by Time Destination   1 : Distal esophagus biopsies Preservative Esophagus, Distal  Mojgan Laurent MD 9/10/2020 4441 Pathology       Assessment:  Intra-procedure medications         Anesthesia gave intra-procedure sedation and medications, see anesthesia flow sheet     Intravenous fluids: LR@ KVO     Vital signs stable       Recommendation:    Permission to share finding with Oscar Fair

## 2020-09-10 NOTE — ANESTHESIA PREPROCEDURE EVALUATION
Anesthetic History     PONV          Review of Systems / Medical History  Patient summary reviewed, nursing notes reviewed and pertinent labs reviewed    Pulmonary                   Neuro/Psych         Psychiatric history ( anxiety)    Comments: panic attacks Cardiovascular                  Exercise tolerance: >4 METS     GI/Hepatic/Renal     GERD  Hepatitis: type C        Comments: H/o duodenal AVM & GI bleed  Solis's Endo/Other             Other Findings   Comments: H/o cervical fusion           Physical Exam    Airway  Mallampati: II  TM Distance: > 6 cm  Neck ROM: decreased range of motion   Mouth opening: Normal     Cardiovascular    Rhythm: regular  Rate: normal         Dental    Dentition: Full upper dentures     Pulmonary  Breath sounds clear to auscultation               Abdominal  GI exam deferred       Other Findings            Anesthetic Plan    ASA: 3  Anesthesia type: general and total IV anesthesia          Induction: Intravenous  Anesthetic plan and risks discussed with: Patient      zofran preop

## 2020-09-10 NOTE — PERIOP NOTES
Permission received to review discharge instructions and discuss private health information with anna Kincaid.

## 2020-09-10 NOTE — PROCEDURES
Esophagogastroduodenoscopy Procedure Note      Myrna Lira  1945  [id number]    Indication:  Solis's Esophagus     Endoscopist: Nicho Ba MD    Referring Provider:  Yasmeen Zhu MD    Sedation:  MAC anesthesia Propofol    Procedure Details:  After infomed consent was obtained for the procedure, with all risks and benefits of procedure explained the patient was taken to the endoscopy suite and placed in the left lateral decubitus position. Following sequential administration of sedation as per above, the endoscope was inserted into the mouth and advanced under direct vision to second portion of the duodenum. A careful inspection was made as the gastroscope was withdrawn, including a retroflexed view of the proximal stomach; findings and interventions are described below. Findings:     Esophagus:   ++ Irregular Z line located at 38 cm from incisors extending proximally for 1 cm without any nodularity and some columnar islands. S/P Bxs to r/o dysplasia. Stomach:   - Normal mucosa to the second portion. Duodenum:   - Normal mucosa to the second portion. No AVMs seen. Therapies:  As above    Specimen: Specimens were collected as described and send to the laboratory. Complications:   None were encountered during the procedure. EBL:  None.           Recommendations:   -f/u path  -repeat EGD for 3 years    Nicho Ba MD  9/10/2020  8:39 AM

## 2021-10-19 ENCOUNTER — TRANSCRIBE ORDER (OUTPATIENT)
Dept: SCHEDULING | Age: 76
End: 2021-10-19

## 2021-10-19 DIAGNOSIS — Z12.31 BREAST CANCER SCREENING BY MAMMOGRAM: Primary | ICD-10-CM

## 2021-11-19 ENCOUNTER — HOSPITAL ENCOUNTER (OUTPATIENT)
Dept: MAMMOGRAPHY | Age: 76
Discharge: HOME | End: 2021-11-19
Attending: FAMILY MEDICINE
Payer: MEDICARE

## 2021-11-19 DIAGNOSIS — Z12.31 BREAST CANCER SCREENING BY MAMMOGRAM: ICD-10-CM

## 2021-11-19 PROCEDURE — 77063 BREAST TOMOSYNTHESIS BI: CPT

## 2022-03-18 PROBLEM — D64.9 SYMPTOMATIC ANEMIA: Status: ACTIVE | Noted: 2018-12-07

## 2022-03-19 PROBLEM — D62 ACUTE BLOOD LOSS ANEMIA: Status: ACTIVE | Noted: 2018-12-07

## 2022-08-24 NOTE — PROGRESS NOTES
Reason for Admission:  Acute Blood loss anemia due to upper GI bleed RRAT Score:    12 Plan for utilizing home health:  No home health needs identified Likelihood of Readmission:  low Transition of Care Plan:   Upon interview patient verified PCP. JUANITOK and demographics. Patient reported she is fully independent, drives and is employed. Patient voiced no concerns regarding discharge. Care Management Interventions PCP Verified by CM: Yes MyChart Signup: No 
Discharge Durable Medical Equipment: No 
Physical Therapy Consult: No 
Occupational Therapy Consult: No 
Speech Therapy Consult: No 
Current Support Network: Own Home Confirm Follow Up Transport: Family Plan discussed with Pt/Family/Caregiver: Yes Discharge Location Discharge Placement: Home [Well Nourished] : well nourished [Well Developed] : well developed [Alert] : ~L alert [Active] : active [Normal Breathing Pattern] : normal breathing pattern [No Respiratory Distress] : no respiratory distress [No Drainage] : no drainage [No Conjunctivitis] : no conjunctivitis [Tympanic Membranes Clear] : tympanic membranes were clear [No Nasal Drainage] : no nasal drainage [No Polyps] : no polyps [No Sinus Tenderness] : no sinus tenderness [No Oral Pallor] : no oral pallor [No Oral Cyanosis] : no oral cyanosis [Non-Erythematous] : non-erythematous [No Exudates] : no exudates [No Postnasal Drip] : no postnasal drip [Tonsil Size ___] : tonsil size [unfilled] [No Tonsillar Enlargement] : no tonsillar enlargement [Absence Of Retractions] : absence of retractions [Symmetric] : symmetric [Good Expansion] : good expansion [No Acc Muscle Use] : no accessory muscle use [Good aeration to bases] : good aeration to bases [Equal Breath Sounds] : equal breath sounds bilaterally [No Crackles] : no crackles [No Rhonchi] : no rhonchi [No Wheezing] : no wheezing [Normal Sinus Rhythm] : normal sinus rhythm [No Heart Murmur] : no heart murmur [Soft, Non-Tender] : soft, non-tender [No Hepatosplenomegaly] : no hepatosplenomegaly [Non Distended] : was not ~L distended [Abdomen Mass (___ Cm)] : no abdominal mass palpated [Full ROM] : full range of motion [No Clubbing] : no clubbing [Capillary Refill < 2 secs] : capillary refill less than two seconds [No Cyanosis] : no cyanosis [No Petechiae] : no petechiae [No Kyphoscoliosis] : no kyphoscoliosis [No Contractures] : no contractures [Alert and  Oriented] : alert and oriented [No Abnormal Focal Findings] : no abnormal focal findings [Normal Muscle Tone And Reflexes] : normal muscle tone and reflexes [No Birth Marks] : no birth marks [No Rashes] : no rashes [No Skin Lesions] : no skin lesions [FreeTextEntry1] : I do not hear any speech [FreeTextEntry2] : Mild shiners [FreeTextEntry5] : There is mild retrognathia with overbite

## 2022-12-30 ENCOUNTER — TRANSCRIBE ORDER (OUTPATIENT)
Dept: SCHEDULING | Age: 77
End: 2022-12-30

## 2022-12-30 DIAGNOSIS — Z12.31 VISIT FOR SCREENING MAMMOGRAM: Primary | ICD-10-CM

## 2023-02-01 ENCOUNTER — HOSPITAL ENCOUNTER (OUTPATIENT)
Dept: MAMMOGRAPHY | Age: 78
Discharge: HOME | End: 2023-02-01
Attending: FAMILY MEDICINE
Payer: MEDICARE

## 2023-02-01 DIAGNOSIS — Z12.31 VISIT FOR SCREENING MAMMOGRAM: ICD-10-CM

## 2023-02-01 PROCEDURE — 77063 BREAST TOMOSYNTHESIS BI: CPT

## 2023-06-01 NOTE — PROGRESS NOTES
Hospitalist Progress Note NAME: Natalie Burt :  1945 MRN:  [id number] Assessment / Plan: 
Acute Blood loss anemia POA- Hb improved appropriately to 6.6 s/p 1 PRBC overnight (Finished ~ 2 AM as per RN) Causing Symptomatic Anemia POA Due to Likely upper GI Bleed POA H/o Gastric/Duodenal AVMs Hb= 5.5-->6.6 H/o melena x 1 week ago 
  
S/p 1 unit PRBC, give 1 more unit PRBCs = total 2 units 
check H/H post transfusion & cont Q 8 hrs S/p Pill cam overnight per GI Dr Kang Argue-- follow results when available Pt NPO p MN per GI= ? EGD/enteroscopy today/?tomorrow IV protonix BID for now Check stool for occult blood IP GI consulted- ?EGD plans if recommended Avoid NSAIDs - discussed with pt 
  
H/o Vertigo- refractory Cont meclizine daily per pt's request 
  
Anxiety disorder Cont home psych meds 
  
  
Code Status: Full Surrogate Decision Maker: Susy David # [phone] 
  
DVT Prophylaxis: SCDs GI Prophylaxis: PPI as above 
  
Baseline: Pt lives alone, independent with ADLs Recommended Disposition: Home w/Family soon Subjective: Chief Complaint / Reason for Physician Visit : F/U Anemia, GI Bleeding, Duodenal AVMs \"I am fine\". Discussed with RN events overnight. Review of Systems: 
Symptom Y/N Comments  Symptom Y/N Comments Fever/Chills n   Chest Pain n   
Poor Appetite n   Edema n   
Cough n   Abdominal Pain n   
Sputum n   Joint Pain n   
SOB/BALBUENA n   Pruritis/Rash Nausea/vomit n   Tolerating PT/OT y Diarrhea n   Tolerating Diet y Constipation    Other Could NOT obtain due to:   
 
Objective: VITALS:  
Last 24hrs VS reviewed since prior progress note. Most recent are: 
Patient Vitals for the past 24 hrs: 
 Temp Pulse Resp BP SpO2  
18 0100 98.1 °F (36.7 °C) 74 16 137/69 97 % 18 0000 98.3 °F (36.8 °C) 77 16 142/66 96 % 18 2330 98.3 °F (36.8 °C) 72 12 154/67 97 % 18 2300 98.9 °F (37.2 °C) 77 18 145/69 98 % 12/07/18 2231 98.6 °F (37 °C) 83 18 145/72 98 % 12/07/18 1932 98.4 °F (36.9 °C) 82 20 143/62 100 % 12/07/18 1708  83 20 138/59 100 % 12/07/18 1529 98.3 °F (36.8 °C) 74 20 130/61 98 % 12/07/18 1400  91 22 139/64 96 % 12/07/18 1333  83 17 145/64 100 % 12/07/18 1255 98 °F (36.7 °C) (!) 105 16 161/53 100 % Intake/Output Summary (Last 24 hours) at 12/8/2018 0720 Last data filed at 12/8/2018 2927 Gross per 24 hour Intake 270 ml Output  Net 270 ml PHYSICAL EXAM: 
General: WD, WN. Alert, cooperative, no acute distress   
EENT:  EOMI. Anicteric sclerae. MMM Resp:  CTA bilaterally, no wheezing or rales. No accessory muscle use CV:  Regular  rhythm,  No edema GI:  Soft, Non distended, Non tender.  +Bowel sounds Neurologic:  Alert and oriented X 3, normal speech, Psych:   Good insight. Not anxious nor agitated Skin:  No rashes. No jaundice Reviewed most current lab test results and cultures  YES Reviewed most current radiology test results   YES Review and summation of old records today    NO Reviewed patient's current orders and MAR    YES 
PMH/SH reviewed - no change compared to H&P 
________________________________________________________________________ Care Plan discussed with: 
  Comments Patient x Family RN x Care Manager Consultant Multidiciplinary team rounds were held today with , nursing, pharmacist and clinical coordinator. Patient's plan of care was discussed; medications were reviewed and discharge planning was addressed. ________________________________________________________________________ Total NON critical care TIME:  26   Minutes Total CRITICAL CARE TIME Spent:   Minutes non procedure based Comments >50% of visit spent in counseling and coordination of care    
________________________________________________________________________ Anastacia Martinez MD  
 
 Procedures: see electronic medical records for all procedures/Xrays and details which were not copied into this note but were reviewed prior to creation of Plan. LABS: 
I reviewed today's most current labs and imaging studies. Pertinent labs include: 
Recent Labs 12/08/18 
0603 12/07/18 
1333 WBC 2.9* 3.2* HGB 6.6* 5.5* HCT 24.2* 21.3*  
 252 Recent Labs 12/08/18 
0603 12/07/18 
1333  138  
K 5.4* 3.9 * 107 CO2 26 25  97 BUN 10 17 CREA 0.73 0.73 CA 8.0* 8.5 ALB  --  4.0 TBILI  --  0.6 SGOT  --  19 ALT  --  18 Signed: Karma Bryan MD 
 
 
 
 
 77.1

## 2023-08-30 ENCOUNTER — HOSPITAL ENCOUNTER (OUTPATIENT)
Facility: HOSPITAL | Age: 78
Setting detail: OUTPATIENT SURGERY
Discharge: HOME | End: 2023-08-30
Attending: SPECIALIST | Admitting: SPECIALIST
Payer: MEDICARE

## 2023-08-30 ENCOUNTER — ANESTHESIA (OUTPATIENT)
Facility: HOSPITAL | Age: 78
End: 2023-08-30
Payer: MEDICARE

## 2023-08-30 ENCOUNTER — ANESTHESIA EVENT (OUTPATIENT)
Facility: HOSPITAL | Age: 78
End: 2023-08-30
Payer: MEDICARE

## 2023-08-30 VITALS
BODY MASS INDEX: 23.46 KG/M2 | HEART RATE: 73 BPM | DIASTOLIC BLOOD PRESSURE: 72 MMHG | WEIGHT: 146 LBS | TEMPERATURE: 97.4 F | RESPIRATION RATE: 15 BRPM | OXYGEN SATURATION: 98 % | HEIGHT: 66 IN | SYSTOLIC BLOOD PRESSURE: 130 MMHG

## 2023-08-30 PROCEDURE — 2720000010 HC SURG SUPPLY STERILE: Performed by: SPECIALIST

## 2023-08-30 PROCEDURE — 3700000000 HC ANESTHESIA ATTENDED CARE: Performed by: SPECIALIST

## 2023-08-30 PROCEDURE — 2580000003 HC RX 258: Performed by: SPECIALIST

## 2023-08-30 PROCEDURE — 6370000000 HC RX 637 (ALT 250 FOR IP): Performed by: REGISTERED NURSE

## 2023-08-30 PROCEDURE — C1889 IMPLANT/INSERT DEVICE, NOC: HCPCS | Performed by: SPECIALIST

## 2023-08-30 PROCEDURE — 6370000000 HC RX 637 (ALT 250 FOR IP): Performed by: SPECIALIST

## 2023-08-30 PROCEDURE — 7100000010 HC PHASE II RECOVERY - FIRST 15 MIN: Performed by: SPECIALIST

## 2023-08-30 PROCEDURE — 2500000003 HC RX 250 WO HCPCS: Performed by: REGISTERED NURSE

## 2023-08-30 PROCEDURE — 2709999900 HC NON-CHARGEABLE SUPPLY: Performed by: SPECIALIST

## 2023-08-30 PROCEDURE — 3600007512: Performed by: SPECIALIST

## 2023-08-30 PROCEDURE — 3700000001 HC ADD 15 MINUTES (ANESTHESIA): Performed by: SPECIALIST

## 2023-08-30 PROCEDURE — 6360000002 HC RX W HCPCS: Performed by: REGISTERED NURSE

## 2023-08-30 PROCEDURE — 3600007502: Performed by: SPECIALIST

## 2023-08-30 DEVICE — CLIP LIG L235CM RESOL 360 BX/20: Type: IMPLANTABLE DEVICE | Status: FUNCTIONAL

## 2023-08-30 RX ORDER — GLYCOPYRROLATE 0.2 MG/ML
INJECTION INTRAMUSCULAR; INTRAVENOUS PRN
Status: DISCONTINUED | OUTPATIENT
Start: 2023-08-30 | End: 2023-08-30 | Stop reason: SDUPTHER

## 2023-08-30 RX ORDER — SIMETHICONE 20 MG/.3ML
EMULSION ORAL PRN
Status: DISCONTINUED | OUTPATIENT
Start: 2023-08-30 | End: 2023-08-30 | Stop reason: ALTCHOICE

## 2023-08-30 RX ORDER — EPHEDRINE SULFATE/0.9% NACL/PF 50 MG/5 ML
SYRINGE (ML) INTRAVENOUS PRN
Status: DISCONTINUED | OUTPATIENT
Start: 2023-08-30 | End: 2023-08-30 | Stop reason: SDUPTHER

## 2023-08-30 RX ORDER — SODIUM CHLORIDE 0.9 % (FLUSH) 0.9 %
5-40 SYRINGE (ML) INJECTION EVERY 12 HOURS SCHEDULED
Status: DISCONTINUED | OUTPATIENT
Start: 2023-08-30 | End: 2023-08-30 | Stop reason: HOSPADM

## 2023-08-30 RX ORDER — SODIUM CHLORIDE 9 MG/ML
25 INJECTION, SOLUTION INTRAVENOUS PRN
Status: DISCONTINUED | OUTPATIENT
Start: 2023-08-30 | End: 2023-08-30 | Stop reason: HOSPADM

## 2023-08-30 RX ORDER — SODIUM CHLORIDE 0.9 % (FLUSH) 0.9 %
5-40 SYRINGE (ML) INJECTION PRN
Status: DISCONTINUED | OUTPATIENT
Start: 2023-08-30 | End: 2023-08-30 | Stop reason: HOSPADM

## 2023-08-30 RX ADMIN — SODIUM CHLORIDE 25 ML: 9 INJECTION, SOLUTION INTRAVENOUS at 07:50

## 2023-08-30 RX ADMIN — BENZOCAINE 1 EACH: 200 SPRAY DENTAL; ORAL; PERIODONTAL at 08:21

## 2023-08-30 RX ADMIN — PROPOFOL 50 MG: 10 INJECTION, EMULSION INTRAVENOUS at 08:21

## 2023-08-30 RX ADMIN — GLYCOPYRROLATE 0.2 MG: 0.2 INJECTION, SOLUTION INTRAMUSCULAR; INTRAVENOUS at 08:40

## 2023-08-30 RX ADMIN — Medication 5 MG: at 08:40

## 2023-08-30 RX ADMIN — LIDOCAINE HYDROCHLORIDE 40 MG: 20 INJECTION, SOLUTION INFILTRATION; PERINEURAL at 08:21

## 2023-08-30 RX ADMIN — PROPOFOL 200 MG: 10 INJECTION, EMULSION INTRAVENOUS at 08:50

## 2023-08-30 ASSESSMENT — PAIN - FUNCTIONAL ASSESSMENT: PAIN_FUNCTIONAL_ASSESSMENT: NONE - DENIES PAIN

## 2023-08-30 NOTE — OP NOTE
Esophagogastroduodenoscopy Procedure Note      Leobardo Allison  1945  [id number]    Indication:  Anemia, H/O GI bleeding from GI AVMs      Endoscopist: Janet Michaud MD    Referring Provider:  Vangie Guerrero MD    Sedation:  MAC anesthesia Propofol    Procedure Details:  After infomed consent was obtained for the procedure, with all risks and benefits of procedure explained the patient was taken to the endoscopy suite and placed in the left lateral decubitus position. Following sequential administration of sedation as per above, the endoscope was inserted into the mouth and advanced under direct vision to second portion of the duodenum. A careful inspection was made as the gastroscope was withdrawn, including a retroflexed view of the proximal stomach; findings and interventions are described below. Findings:     Esophagus:   - Normal mucosa throughout the esophagus. Z line normal at 40 cm. Stomach:   + Normal stomach mucosa, normal pylorus. No AVMs. Duodenum:   ++ (3) AVMs seen in the second portion of the duodenum: each treated with APC on duodenum setting with hemoclip x 2 placed on site. Therapies:  as above    Specimen: none           Complications:   None were encountered during the procedure. EBL: < 10 ml.           Recommendations:   -F/U with Dr. Kris Burkitt, MD  8/30/2023  8:54 AM

## 2023-08-30 NOTE — ANESTHESIA POSTPROCEDURE EVALUATION
Department of Anesthesiology  Postprocedure Note    Patient: Ned Epps  MRN: [id number]  YOB: 1945  Date of evaluation: 8/30/2023      Procedure Summary     Date: 08/30/23 Room / Location: [facility] ENDO 01 / [facility]    Anesthesia Start: 1490 Anesthesia Stop: 3655    Procedures:       COLONOSCOPY, EGD (Lower GI Region)      .  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) [Q27.33])    Surgeons: Chiqui Gutiérrez MD Responsible Provider: Sharona Smith MD    Anesthesia Type: MAC ASA Status: 3          Anesthesia Type: MAC    Araseli Phase I: Araseli Score: 10    Araseli Phase II: Araseli Score: 10      Anesthesia Post Evaluation    Patient location during evaluation: PACU  Patient participation: complete - patient participated  Level of consciousness: sleepy but conscious and responsive to verbal stimuli  Airway patency: patent  Nausea & Vomiting: no vomiting and no nausea  Complications: no  Cardiovascular status: blood pressure returned to baseline and hemodynamically stable  Respiratory status: acceptable  Hydration status: stable

## 2023-08-30 NOTE — PROGRESS NOTES
Endoscopy Case End Note:    9756:  Procedure scope was pre-cleaned, per protocol, at bedside by Albino Seymour.      2796:  Report received from anesthesia Cyndee Lara CRNA. See anesthesia flowsheet for intra-procedure vital signs and events. 4719:  denture(upper) returned to patient.

## 2023-08-30 NOTE — OP NOTE
Colonoscopy Procedure Note    Indications:   Iron deficiency anemia    Referring Physician: Warren Jensen MD  Anesthesia/Sedation: MAC anesthesia Propofol  Endoscopist:  Dr. Neena Sharma    Procedure in Detail:  Informed consent was obtained for the procedure, including sedation. Risks of perforation, hemorrhage, adverse drug reaction, and aspiration were discussed. The patient was placed in the left lateral decubitus position. Based on the pre-procedure assessment, including review of the patient's medical history, medications, allergies, and review of systems, she had been deemed to be an appropriate candidate for moderate sedation; she was therefore sedated with the medications listed above. The patient was monitored continuously with ECG tracing, pulse oximetry, blood pressure monitoring, and direct observations. A rectal examination was performed. The [id number] was inserted into the rectum and advanced under direct vision to the cecum, which was identified by the ileocecal valve and appendiceal orifice. The quality of the colonic preparation was adequate. A careful inspection was made as the colonoscope was withdrawn, including a retroflexed view of the rectum; findings and interventions are described below. Appropriate photodocumentation was obtained. Findings:    Scope advanced to the cecum. Single nonbleeding pale AVM in the transverse colon s/p hemoclip placed. Diffuse diverticulosis in the colon of moderate severity. Normal mucosa, no polyps. Therapies:  as above    Specimen:  none     Complications: None were encountered during the procedure. EBL: < 10 ml.     Recommendations:   -Repeat Colonoscopy in 5 years    Signed By: Neena Sharma MD                        August 30, 2023

## 2023-08-30 NOTE — PROGRESS NOTES
The risks and benefits of the bite block have been explained to patient. Patient verbalizes understanding. Pt has full upper denture plate removed.

## 2023-08-30 NOTE — PROGRESS NOTES
ARRIVAL INFORMATION:  Verified patient name and date of birth, scheduled procedure, and informed consent. : Benja Prince (friend) contact number:  Physician and staff cannot share information with the . Belongings with patient include:  Clothing,Dentures upper        GI FOCUSED ASSESSMENT:  Neuro: Awake, alert, oriented x4  Respiratory: even and unlabored   GI: soft and non-distended  EKG Rhythm: normal sinus rhythm    Education:Reviewed general discharge instructions and  information.

## 2023-08-30 NOTE — PROGRESS NOTES
TRANSFER - IN REPORT:    Verbal report received from Mary Carmen mathias Dinorahcharlie Allison  being received from [facility]   for routine progression of patient care      Report consisted of patient's Situation, Background, Assessment and   Recommendations(SBAR). Information from the following report(s) Nurse Handoff Report was reviewed with the receiving nurse. Opportunity for questions and clarification was provided. Assessment completed upon patient's arrival to unit and care assumed.

## 2023-08-30 NOTE — DISCHARGE INSTRUCTIONS
Ed Allison  [id number]  1945    COLON / EGD DISCHARGE INSTRUCTIONS  Discomfort:  Sore throat- throat lozenges or warm salt water gargle  Redness at IV site- apply warm compress to area; if redness or soreness persist- contact your physician  There may be a slight amount of blood passed from the rectum  Gaseous discomfort- walking, belching will help relieve any discomfort  You may not operate a vehicle for 12 hours  You may not engage in an occupation involving machinery or appliances for rest of today  You may not drink alcoholic beverages for at least 12 hours  Avoid making any critical decisions for at least 24 hour  DIET:      - however -  remember your colon is empty and a heavy meal will produce gas. Avoid these foods:  vegetables, fried / greasy foods, carbonated drinks for today     ACTIVITY:  You may  resume your normal daily activities it is recommended that you spend the remainder of the day resting -  avoid any strenuous activity. CALL M.D. ANY SIGN OF:   Increasing pain, nausea, vomiting  Abdominal distension (swelling)  New increased bleeding (oral or rectal)  Fever (chills)  Pain in chest area  Bloody discharge from nose or mouth  Shortness of breath  Tylenol as needed for pain.       Follow-up Instructions:   Call Dr. 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 (treated with cautery and clip placement)    Diverticulosis in colon                  Mg Perez  [id number]  1945        Patient Education on Sedation / Analgesia Administered for Procedure      For 24 hours after general anesthesia or intravenous analgesia / sedation:  Have someone responsible help you with your care  Limit your activities  Do not drive and operate hazardous machinery  Do not make important personal, legal or business decisions  Do not drink alcoholic beverages  If you have not urinated within 8 hours after discharge, please contact your physician  Resume your medications unless otherwise instructed    For 24 hours after general anesthesia or intravenous analgesia / sedation  you may experience:  Drowsiness, dizziness, sleepiness, or confusion  Difficulty remembering or delayed reaction times  Difficulty with your balance, especially while walking, move slowly and carefully, do not make sudden position changes  Difficulty focusing or blurred vision    You may not be aware of slight changes in your behavior and/or your reaction time because of the medication used during and after your procedure.     Report the following to your physician:  Excessive pain, swelling, redness or odor of or around the surgical area  Temperature over 100.5  Nausea and vomiting lasting longer than 4 hours or if unable to take medications  Any signs of decreased circulation or nerve impairment to extremity: change in color, persistent numbness, tingling, coldness or increase pain  Any questions or concerns    IF YOU REPORT TO AN EMERGENCY ROOM, DOCTOR'S OFFICE OR HOSPITAL WITHIN 24 HOURS AFTER YOUR PROCEDURE, BRING THIS SHEET AND YOUR AFTER VISIT SUMMARY WITH YOU AND GIVE IT TO THE PHYSICIAN OR NURSE ATTENDING YOU.

## 2023-08-30 NOTE — H&P
Gastroenterology Outpatient History and Physical    Patient: Nena Worley    Physician: Neena Sharma MD    Vital Signs: Blood pressure (!) 145/75, pulse 66, resp. rate 22, height 1.676 m (5' 6\"), weight 66.2 kg (146 lb), SpO2 96 %. Allergies: Allergies   Allergen Reactions    Carbamazepine Hives    Ciprofloxacin Other (See Comments)     Hand pain    Lansoprazole Other (See Comments)     Ulcers in mouth. Methylprednisolone Other (See Comments)     Dizziness and felt disoriented    Naproxen Dizziness or Vertigo     Unsure of reaction. Sulfa Antibiotics      Other reaction(s): Unknown (comments)  As child. Unsure of reaction.        Chief Complaint: Iron Def Anemia, H/O GI AVMs    History of Present Illness: above    Justification for Procedure: above    History:  Past Medical History:   Diagnosis Date    GERD (gastroesophageal reflux disease)     GI bleed     Duodenal AVMs    Ill-defined condition     Alcantara's esophagus    Ill-defined condition     small gallstones    Ill-defined condition     hepatitis C - treated no longer detected in blood    Irregular heart beat     evaluated and thought to be panic attacks    Nausea & vomiting     \"sick from not eating\"/gets something for N/V prior to anesthesia    Psychiatric disorder     anxiety      Past Surgical History:   Procedure Laterality Date    BREAST BIOPSY Right Years ago    Benign surgical bx yrs ago    BREAST SURGERY Right     lump removed - benign lt    COLONOSCOPY N/A 8/24/2016    COLONOSCOPY performed by Mp Gonzales MD at [facility] ENDOSCOPY    HYSTERECTOMY ([facility])      total    ORTHOPEDIC SURGERY  1997    bone fusion in neck    OTHER SURGICAL HISTORY      colonoscopy - multiple - one polyp/EGD    OTHER SURGICAL HISTORY      bone removed from hip to place in neck    UPPER GASTROINTESTINAL ENDOSCOPY        Social History     Socioeconomic History    Marital status:    Tobacco Use    Smoking status: Former     Packs/day:

## 2023-10-05 RX ORDER — LANOLIN ALCOHOL/MO/W.PET/CERES
1000 CREAM (GRAM) TOPICAL DAILY
COMMUNITY

## 2023-10-13 ENCOUNTER — ANESTHESIA EVENT (OUTPATIENT)
Facility: HOSPITAL | Age: 78
End: 2023-10-13
Payer: MEDICARE

## 2023-10-13 RX ORDER — ONDANSETRON 2 MG/ML
4 INJECTION INTRAMUSCULAR; INTRAVENOUS
Status: CANCELLED | OUTPATIENT
Start: 2023-10-13 | End: 2023-10-14

## 2023-10-13 RX ORDER — MEPERIDINE HYDROCHLORIDE 25 MG/ML
12.5 INJECTION INTRAMUSCULAR; INTRAVENOUS; SUBCUTANEOUS EVERY 5 MIN PRN
Status: CANCELLED | OUTPATIENT
Start: 2023-10-13

## 2023-10-13 RX ORDER — SODIUM CHLORIDE 0.9 % (FLUSH) 0.9 %
5-40 SYRINGE (ML) INJECTION PRN
Status: CANCELLED | OUTPATIENT
Start: 2023-10-13

## 2023-10-13 RX ORDER — SODIUM CHLORIDE 0.9 % (FLUSH) 0.9 %
5-40 SYRINGE (ML) INJECTION EVERY 12 HOURS SCHEDULED
Status: CANCELLED | OUTPATIENT
Start: 2023-10-13

## 2023-10-13 RX ORDER — MIDAZOLAM HYDROCHLORIDE 1 MG/ML
2 INJECTION, SOLUTION INTRAMUSCULAR; INTRAVENOUS
Status: CANCELLED | OUTPATIENT
Start: 2023-10-13 | End: 2023-10-14

## 2023-10-13 RX ORDER — OXYCODONE HYDROCHLORIDE 5 MG/1
10 TABLET ORAL PRN
Status: CANCELLED | OUTPATIENT
Start: 2023-10-13 | End: 2023-10-13

## 2023-10-13 RX ORDER — ACETAMINOPHEN 500 MG
1000 TABLET ORAL
Status: CANCELLED | OUTPATIENT
Start: 2023-10-13 | End: 2023-10-14

## 2023-10-13 RX ORDER — DROPERIDOL 2.5 MG/ML
0.62 INJECTION, SOLUTION INTRAMUSCULAR; INTRAVENOUS
Status: CANCELLED | OUTPATIENT
Start: 2023-10-13 | End: 2023-10-14

## 2023-10-13 RX ORDER — SODIUM CHLORIDE 9 MG/ML
INJECTION, SOLUTION INTRAVENOUS PRN
Status: CANCELLED | OUTPATIENT
Start: 2023-10-13

## 2023-10-13 RX ORDER — OXYCODONE HYDROCHLORIDE 5 MG/1
5 TABLET ORAL PRN
Status: CANCELLED | OUTPATIENT
Start: 2023-10-13 | End: 2023-10-13

## 2023-10-13 RX ORDER — TROPICAMIDE 10 MG/ML
1 SOLUTION/ DROPS OPHTHALMIC SEE ADMIN INSTRUCTIONS
Status: COMPLETED | OUTPATIENT
Start: 2023-10-16 | End: 2023-10-16

## 2023-10-13 RX ORDER — FENTANYL CITRATE 50 UG/ML
25 INJECTION, SOLUTION INTRAMUSCULAR; INTRAVENOUS EVERY 5 MIN PRN
Status: CANCELLED | OUTPATIENT
Start: 2023-10-13

## 2023-10-13 RX ORDER — CIPROFLOXACIN HYDROCHLORIDE 3.5 MG/ML
1 SOLUTION/ DROPS TOPICAL SEE ADMIN INSTRUCTIONS
Status: COMPLETED | OUTPATIENT
Start: 2023-10-16 | End: 2023-10-16

## 2023-10-16 ENCOUNTER — HOSPITAL ENCOUNTER (OUTPATIENT)
Facility: HOSPITAL | Age: 78
Setting detail: OUTPATIENT SURGERY
Discharge: HOME | End: 2023-10-16
Attending: OPHTHALMOLOGY | Admitting: OPHTHALMOLOGY
Payer: MEDICARE

## 2023-10-16 ENCOUNTER — ANESTHESIA (OUTPATIENT)
Facility: HOSPITAL | Age: 78
End: 2023-10-16
Payer: MEDICARE

## 2023-10-16 VITALS
BODY MASS INDEX: 23.14 KG/M2 | DIASTOLIC BLOOD PRESSURE: 67 MMHG | RESPIRATION RATE: 14 BRPM | SYSTOLIC BLOOD PRESSURE: 118 MMHG | OXYGEN SATURATION: 99 % | HEIGHT: 66 IN | HEART RATE: 81 BPM | WEIGHT: 144 LBS | TEMPERATURE: 97.2 F

## 2023-10-16 PROCEDURE — 2580000003 HC RX 258: Performed by: ANESTHESIOLOGY

## 2023-10-16 PROCEDURE — 6370000000 HC RX 637 (ALT 250 FOR IP): Performed by: OPHTHALMOLOGY

## 2023-10-16 PROCEDURE — A4216 STERILE WATER/SALINE, 10 ML: HCPCS | Performed by: OPHTHALMOLOGY

## 2023-10-16 PROCEDURE — 7100000010 HC PHASE II RECOVERY - FIRST 15 MIN: Performed by: OPHTHALMOLOGY

## 2023-10-16 PROCEDURE — 6360000002 HC RX W HCPCS: Performed by: NURSE ANESTHETIST, CERTIFIED REGISTERED

## 2023-10-16 PROCEDURE — 3600000003 HC SURGERY LEVEL 3 BASE: Performed by: OPHTHALMOLOGY

## 2023-10-16 PROCEDURE — C1783 OCULAR IMP, AQUEOUS DRAIN DE: HCPCS | Performed by: OPHTHALMOLOGY

## 2023-10-16 PROCEDURE — 2580000003 HC RX 258: Performed by: OPHTHALMOLOGY

## 2023-10-16 PROCEDURE — 3700000001 HC ADD 15 MINUTES (ANESTHESIA): Performed by: OPHTHALMOLOGY

## 2023-10-16 PROCEDURE — 7100000001 HC PACU RECOVERY - ADDTL 15 MIN: Performed by: OPHTHALMOLOGY

## 2023-10-16 PROCEDURE — 2709999900 HC NON-CHARGEABLE SUPPLY: Performed by: OPHTHALMOLOGY

## 2023-10-16 PROCEDURE — 6360000002 HC RX W HCPCS: Performed by: OPHTHALMOLOGY

## 2023-10-16 PROCEDURE — 7100000000 HC PACU RECOVERY - FIRST 15 MIN: Performed by: OPHTHALMOLOGY

## 2023-10-16 PROCEDURE — V2632 POST CHMBR INTRAOCULAR LENS: HCPCS | Performed by: OPHTHALMOLOGY

## 2023-10-16 PROCEDURE — 3700000000 HC ANESTHESIA ATTENDED CARE: Performed by: OPHTHALMOLOGY

## 2023-10-16 PROCEDURE — 2500000003 HC RX 250 WO HCPCS: Performed by: OPHTHALMOLOGY

## 2023-10-16 PROCEDURE — 6360000002 HC RX W HCPCS

## 2023-10-16 PROCEDURE — 6370000000 HC RX 637 (ALT 250 FOR IP)

## 2023-10-16 PROCEDURE — 3600000013 HC SURGERY LEVEL 3 ADDTL 15MIN: Performed by: OPHTHALMOLOGY

## 2023-10-16 DEVICE — LENS CLAREON IOL 21.5D: Type: IMPLANTABLE DEVICE | Site: EYE | Status: FUNCTIONAL

## 2023-10-16 RX ORDER — SODIUM CHLORIDE 0.9 % (FLUSH) 0.9 %
5-40 SYRINGE (ML) INJECTION PRN
Status: DISCONTINUED | OUTPATIENT
Start: 2023-10-16 | End: 2023-10-16 | Stop reason: HOSPADM

## 2023-10-16 RX ORDER — NEOMYCIN SULFATE, POLYMYXIN B SULFATE, AND DEXAMETHASONE 3.5; 10000; 1 MG/G; [USP'U]/G; MG/G
OINTMENT OPHTHALMIC ONCE
Status: COMPLETED | OUTPATIENT
Start: 2023-10-16 | End: 2023-10-16

## 2023-10-16 RX ORDER — SODIUM CHLORIDE, SODIUM LACTATE, POTASSIUM CHLORIDE, CALCIUM CHLORIDE 600; 310; 30; 20 MG/100ML; MG/100ML; MG/100ML; MG/100ML
INJECTION, SOLUTION INTRAVENOUS CONTINUOUS
Status: DISCONTINUED | OUTPATIENT
Start: 2023-10-16 | End: 2023-10-16 | Stop reason: HOSPADM

## 2023-10-16 RX ORDER — CAFFEINE CITRATE 20 MG/ML
60 SOLUTION INTRAVENOUS ONCE
Status: COMPLETED | OUTPATIENT
Start: 2023-10-16 | End: 2023-10-16

## 2023-10-16 RX ORDER — CAFFEINE CITRATE 20 MG/ML
SOLUTION INTRAVENOUS
Status: COMPLETED
Start: 2023-10-16 | End: 2023-10-16

## 2023-10-16 RX ORDER — TIMOLOL MALEATE 5 MG/ML
1 SOLUTION/ DROPS OPHTHALMIC ONCE
Status: COMPLETED | OUTPATIENT
Start: 2023-10-16 | End: 2023-10-16

## 2023-10-16 RX ORDER — TROPICAMIDE 10 MG/ML
SOLUTION/ DROPS OPHTHALMIC
Status: COMPLETED
Start: 2023-10-16 | End: 2023-10-16

## 2023-10-16 RX ORDER — MIDAZOLAM HYDROCHLORIDE 1 MG/ML
INJECTION INTRAMUSCULAR; INTRAVENOUS PRN
Status: DISCONTINUED | OUTPATIENT
Start: 2023-10-16 | End: 2023-10-16 | Stop reason: SDUPTHER

## 2023-10-16 RX ORDER — LIDOCAINE HYDROCHLORIDE 10 MG/ML
1 INJECTION, SOLUTION EPIDURAL; INFILTRATION; INTRACAUDAL; PERINEURAL
Status: DISCONTINUED | OUTPATIENT
Start: 2023-10-16 | End: 2023-10-16 | Stop reason: HOSPADM

## 2023-10-16 RX ORDER — TETRACAINE HYDROCHLORIDE 5 MG/ML
1 SOLUTION OPHTHALMIC ONCE
Status: DISCONTINUED | OUTPATIENT
Start: 2023-10-16 | End: 2023-10-16 | Stop reason: HOSPADM

## 2023-10-16 RX ORDER — CIPROFLOXACIN HYDROCHLORIDE 3.5 MG/ML
SOLUTION/ DROPS TOPICAL
Status: COMPLETED
Start: 2023-10-16 | End: 2023-10-16

## 2023-10-16 RX ORDER — SODIUM CHLORIDE 9 MG/ML
INJECTION, SOLUTION INTRAVENOUS PRN
Status: DISCONTINUED | OUTPATIENT
Start: 2023-10-16 | End: 2023-10-16 | Stop reason: HOSPADM

## 2023-10-16 RX ADMIN — SODIUM CHLORIDE: 9 INJECTION, SOLUTION INTRAVENOUS at 10:21

## 2023-10-16 RX ADMIN — CAFFEINE CITRATE 60 MG: 20 SOLUTION INTRAVENOUS at 10:49

## 2023-10-16 RX ADMIN — TROPICAMIDE 1 DROP: 10 SOLUTION/ DROPS OPHTHALMIC at 10:21

## 2023-10-16 RX ADMIN — CIPROFLOXACIN 1 DROP: 3 SOLUTION OPHTHALMIC at 10:21

## 2023-10-16 RX ADMIN — CIPROFLOXACIN HYDROCHLORIDE 1 DROP: 3.5 SOLUTION/ DROPS TOPICAL at 10:26

## 2023-10-16 RX ADMIN — CIPROFLOXACIN HYDROCHLORIDE 1 DROP: 3.5 SOLUTION/ DROPS TOPICAL at 10:31

## 2023-10-16 RX ADMIN — CIPROFLOXACIN HYDROCHLORIDE 1 DROP: 3.5 SOLUTION/ DROPS TOPICAL at 10:21

## 2023-10-16 RX ADMIN — TROPICAMIDE 1 DROP: 10 SOLUTION/ DROPS OPHTHALMIC at 10:31

## 2023-10-16 RX ADMIN — TROPICAMIDE 1 DROP: 10 SOLUTION/ DROPS OPHTHALMIC at 10:26

## 2023-10-16 RX ADMIN — CAFFEINE CITRATE 60 MG: 20 INJECTION, SOLUTION INTRAVENOUS at 10:49

## 2023-10-16 RX ADMIN — MIDAZOLAM HYDROCHLORIDE 1 MG: 1 INJECTION, SOLUTION INTRAMUSCULAR; INTRAVENOUS at 11:23

## 2023-10-16 RX ADMIN — MIDAZOLAM HYDROCHLORIDE 1 MG: 1 INJECTION, SOLUTION INTRAMUSCULAR; INTRAVENOUS at 11:19

## 2023-10-16 ASSESSMENT — PAIN - FUNCTIONAL ASSESSMENT: PAIN_FUNCTIONAL_ASSESSMENT: 0-10

## 2023-10-16 NOTE — PROGRESS NOTES
Permission received to review discharge instructions and discuss private health information with boyfriend, Jose Dao. Patient states that Jose Dao will be with them for at least 24 hours following today's procedure.

## 2023-10-16 NOTE — BRIEF OP NOTE
Brief Postoperative Note      Patient: Rina Briscoe  YOB: 1945  MRN: [id number]    Date of Procedure: 10/16/2023    Pre-Op Diagnosis Codes:     * Primary open angle glaucoma of right eye, moderate stage [H40.1112]     * Age-related nuclear cataract of right eye [H25.11]    Post-Op Diagnosis:    * Primary open angle glaucoma of right eye, moderate stage [H40.1112]     * Age-related nuclear cataract of right eye [H25.11]       Procedure(s):  RIGHT EYE CATARACT EXTRACTION WITH INTRA OCULAR LENS IMPLANT WITH ISTENT    Surgeon(s):  Salvador Harris MD    Assistant:  * No surgical staff found *    Anesthesia: Monitor Anesthesia Care    Estimated Blood Loss (mL): 0    Complications: none    Specimens:   * No specimens in log *    Implants:  Implant Name Type Inv.  Item Serial No.  Lot No. LRB No. Used Action   JENY [id number] 21.5D   [id number]  NA Right 1 Implanted   STENT OPHTH L EYE TRABECULAR FLAVIO BYPS ISTNT - [id number]  STENT OPHTH L EYE TRABECULAR FLAVIO BYPS ISTNT [id number] [employer] [facility]- [id number] Right 1 Implanted         Drains: * No LDAs found *    Findings: Visually significant cataract right eye and open angle glaucoma right ey      Electronically signed by Salvador Harris MD on 10/16/2023 at 12:03 PM

## 2023-10-16 NOTE — PERIOP NOTE
Arielle Keys Keegan  1945  [id number]    Situation:  Verbal report given from: [address]  Procedure: Procedure(s):  RIGHT EYE CATARACT EXTRACTION WITH INTRA OCULAR LENS IMPLANT WITH ISTENT    Background:    Preoperative diagnosis: Primary open angle glaucoma of right eye, moderate stage [H40.1112]  Age-related nuclear cataract of right eye [H25.11]    Postoperative diagnosis: * No post-op diagnosis entered *    :  Dr. Katja Ochoa    Assistant(s): Circulator: Terra Naranjo RN  Relief Circulator: Mayela Dupree RN  Relief Scrub: [facility] Person First: Kvng Jennings  Circulator Assist: Benoit Hernández RN    Specimens: * No specimens in log *    Assessment:  Intra-procedure medications         Anesthesia gave intra-procedure sedation and medications, see anesthesia flow sheet     Intravenous fluids: LR@ KVO     Vital signs stable      Recommendation:    Permission to share finding with [employer]
Pt discharged via wheelchair, accompanied by RN. Pt discharged awake and alert, respirations equal and unlabored, skin warm, dry, and intact. Pt and family members' questions and concerns addressed prior to discharge.
stop or if you need to continue taking. Please do not stop taking these medications without instructions from your surgeon. 6. In an effort to help prevent surgical site infection, we ask that you shower with an anti-bacterial soap (i.e. Dial/Safeguard, or the soap provided to you at your preadmission testing appointment)on the morning of surgery, using a fresh towel after each shower. Do not apply any lotions, powders, or deodorants after the shower on the day of your procedure. If applicable, please do not shave the operative site for 48 hours prior to surgery. 7. Wear comfortable clothes. Wear glasses instead of contacts. Do not bring any jewelry or money (other than copays or fees as instructed). Do not wear make-up, particularly mascara, the morning of your surgery. Do not wear nail polish, particularly if you are having foot /hand surgery. Wear your hair loose or down, no ponytails, buns, ceci pins or clips. All body piercings must be removed. 8. You should understand that if you do not follow these instructions your surgery may be cancelled. If your physical condition changes (i.e. fever, cold or flu) please contact your surgeon as soon as possible. 9. It is important that you be on time. If a situation occurs where you may be late, or if you have any questions or problems, please call [phone].    10. Your surgery time may be subject to change. You will receive a phone call the day prior to surgery to confirm your arrival time. 11. Pediatric patients: please bring a change of clothes, diapers, bottle/sippy cup, pacifier, etc.      Special Instructions: Take all medications and inhalers, as prescribed, on the morning of surgery with a sip of water EXCEPT: n/a      Insulin Dependent Diabetic patients: Take your diabetic medications as prescribed the day before surgery. Hold all diabetic medications the day of surgery.     If you are scheduled to arrive for surgery after 8:00 AM, and

## 2023-10-16 NOTE — ANESTHESIA POSTPROCEDURE EVALUATION
Department of Anesthesiology  Postprocedure Note    Patient: Matty Oconnell  MRN: [id number]  YOB: 1945  Date of evaluation: 10/16/2023      Procedure Summary     Date: 10/16/23 Room / Location: \[facility]\"" ASU B3 / \[facility]\"" AMBULATORY OR    Anesthesia Start: 1119 Anesthesia Stop: 1200    Procedure: RIGHT EYE CATARACT EXTRACTION WITH INTRA OCULAR LENS IMPLANT WITH ISTENT (Right: Eye) Diagnosis:       Primary open angle glaucoma of right eye, moderate stage      Age-related nuclear cataract of right eye      (Primary open angle glaucoma of right eye, moderate stage [H40.1112])      (Age-related nuclear cataract of right eye [H25.11])    Surgeons: Francisco Moe MD Responsible Provider: Aline Byrnes MD    Anesthesia Type: MAC ASA Status: 3          Anesthesia Type: No value filed. Araseli Phase I: Araseli Score: 10    Araseli Phase II: Araseli Score: 10      Anesthesia Post Evaluation    Patient location during evaluation: PACU  Patient participation: complete - patient participated  Level of consciousness: awake and alert  Pain score: 0  Airway patency: patent  Nausea & Vomiting: no nausea and no vomiting  Complications: no  Cardiovascular status: hemodynamically stable  Respiratory status: acceptable  Hydration status: euvolemic  There was medical reason for not using a multimodal analgesia pain management approach. Pain management: satisfactory to patient

## 2023-10-16 NOTE — OP NOTE
was given sedation. Tetracaine was given topically to the right eye, and the eye was prepped and draped in the usual manner for sterile eye surgery, including Betadine solution being dropped onto the conjunctiva at the beginning of the prep. The eyelashes were isolated with a plastic drape. A lid speculum was placed. A #15 blade was used to make a paracentesis at the 10:00 location. The eye was flushed with a lidocaine / epinephrine mixture (\"Shugarcaine\"). The eye was filled with Viscoat (Duovisc), and a crescent blade was used to make a 2.5 mm incision at the limbus temporally. This was dissected 2 mm into clear cornea, and the eye was entered with a 2.4 mm keratome. A 0.12 forceps was used for fixation during the procedure. A capsulorhexis flap was started with a cystotome, and this was completed 360 degrees with Utrata forceps. The capsular piece was removed. [place] Patent dissection was performed with the \"Shugarcaine\" mixture on a cannula. The lens nucleus was removed using phacoemulsification with a total phaco time of 1:46 minutes at 14.1%. The lens was cracked and manipulated with a Sinsky hook. Residual cortex was removed using irrigation / aspiration. The capsule remained intact. The capsule was refilled with Provisc (Duovisc), and an Lukas Intraocular lens model SN60WF power 21.5 was placed in a lens folding cartridge with Provisc. The lens was unfolded into the capsular bag. The lens centered well. The I-stent was then placed. Additional viscoat was used nasally to separate the iris from the cornea in the nasal angle. The patient's head was rotated nasally 45 degrees and the operating microscope was turned toward the surgeon 45 degrees. Viscoat was placed on the corneal surface and a gonio prism was placed on the cornea. The trabecular meshwork was clearly visible after magnification was increased and the focusing.   The I-stent was inspected and was found to be in

## 2023-10-16 NOTE — DISCHARGE INSTRUCTIONS
Yumiko Painter MD  [address]   MOB [address]  [place], [facility] [place]  Phone: [phone]  If you are unable to keep appointment, kindly give 24 hours notice please. REMOVE PATCH  START DROPS WHEN YOU GET HOME  PUT PATCH BACK ON AT BEDTIME    DO NOT RUB the eye that was operated on. Do not strain excessively. It is all right to bend as long as you do not strain. It is safe to take a shower, wash your face, and wash your hair. Just keep the eye closed. Do not swim for 1 week after surgery. If you have any problems or questions, do not hesitate to call [phone]. Follow instructions on eye drops from office. You may take Tylenol or Advil for discomfort. If it pressure not relieved by Tylenol or Advil, please call Dr. Stormy Noguera office. TO PREVENT AN INFECTION      [address] [place] [address] YOUR HANDS    To prevent infection, good handwashing is the most important thing you or your caregiver can do. Wash your hands with soap and water or use the hand  we gave you before you touch any wounds. USE CLEAN SHEETS    Use freshly cleaned sheets on your bed after surgery. To keep the surgery site clean, do not allow pets to sleep with you while your wound is still healing. STOP SMOKING    Stop smoking, or at least cut back on smoking    Smoking slows your healing. CONTROL YOUR BLOOD SUGAR    High blood sugars slow wound healing. If you are diabetic, control your blood sugar levels before and after your surgery. If you were given prescriptions, please review the written information on the prescribed medications. DO NOT DRIVE WHILE TAKING NARCOTIC PAIN MEDICATIONS.     DISCHARGE SUMMARY from Nurse    The following personal items collected during your admission are returned to you:   Dental Appliance:    Vision:    Hearing Aid:    Jewelry:    Clothing:    Other Valuables:    Valuables sent to safe:      PATIENT INSTRUCTIONS:    After general anesthesia or intravenous

## 2023-10-20 ENCOUNTER — ANESTHESIA EVENT (OUTPATIENT)
Facility: HOSPITAL | Age: 78
End: 2023-10-20
Payer: MEDICARE

## 2023-10-20 RX ORDER — TROPICAMIDE 10 MG/ML
1 SOLUTION/ DROPS OPHTHALMIC SEE ADMIN INSTRUCTIONS
Status: COMPLETED | OUTPATIENT
Start: 2023-10-23 | End: 2023-10-23

## 2023-10-20 RX ORDER — TETRACAINE HYDROCHLORIDE 5 MG/ML
1 SOLUTION OPHTHALMIC ONCE
Status: DISCONTINUED | OUTPATIENT
Start: 2023-10-23 | End: 2023-10-23 | Stop reason: HOSPADM

## 2023-10-20 RX ORDER — CIPROFLOXACIN HYDROCHLORIDE 3.5 MG/ML
1 SOLUTION/ DROPS TOPICAL SEE ADMIN INSTRUCTIONS
Status: COMPLETED | OUTPATIENT
Start: 2023-10-23 | End: 2023-10-23

## 2023-10-23 ENCOUNTER — HOSPITAL ENCOUNTER (OUTPATIENT)
Facility: HOSPITAL | Age: 78
Setting detail: OUTPATIENT SURGERY
Discharge: HOME | End: 2023-10-23
Attending: OPHTHALMOLOGY | Admitting: OPHTHALMOLOGY
Payer: MEDICARE

## 2023-10-23 ENCOUNTER — ANESTHESIA (OUTPATIENT)
Facility: HOSPITAL | Age: 78
End: 2023-10-23
Payer: MEDICARE

## 2023-10-23 VITALS
RESPIRATION RATE: 13 BRPM | HEIGHT: 66 IN | TEMPERATURE: 97.2 F | OXYGEN SATURATION: 97 % | DIASTOLIC BLOOD PRESSURE: 68 MMHG | HEART RATE: 70 BPM | SYSTOLIC BLOOD PRESSURE: 131 MMHG | BODY MASS INDEX: 23.78 KG/M2 | WEIGHT: 148 LBS

## 2023-10-23 PROCEDURE — 7100000011 HC PHASE II RECOVERY - ADDTL 15 MIN: Performed by: OPHTHALMOLOGY

## 2023-10-23 PROCEDURE — 3600000013 HC SURGERY LEVEL 3 ADDTL 15MIN: Performed by: OPHTHALMOLOGY

## 2023-10-23 PROCEDURE — 2500000003 HC RX 250 WO HCPCS: Performed by: OPHTHALMOLOGY

## 2023-10-23 PROCEDURE — 7100000010 HC PHASE II RECOVERY - FIRST 15 MIN: Performed by: OPHTHALMOLOGY

## 2023-10-23 PROCEDURE — 3700000001 HC ADD 15 MINUTES (ANESTHESIA): Performed by: OPHTHALMOLOGY

## 2023-10-23 PROCEDURE — 2709999900 HC NON-CHARGEABLE SUPPLY: Performed by: OPHTHALMOLOGY

## 2023-10-23 PROCEDURE — V2632 POST CHMBR INTRAOCULAR LENS: HCPCS | Performed by: OPHTHALMOLOGY

## 2023-10-23 PROCEDURE — 6360000002 HC RX W HCPCS: Performed by: ANESTHESIOLOGY

## 2023-10-23 PROCEDURE — A4216 STERILE WATER/SALINE, 10 ML: HCPCS | Performed by: OPHTHALMOLOGY

## 2023-10-23 PROCEDURE — 3600000003 HC SURGERY LEVEL 3 BASE: Performed by: OPHTHALMOLOGY

## 2023-10-23 PROCEDURE — 6370000000 HC RX 637 (ALT 250 FOR IP)

## 2023-10-23 PROCEDURE — 2580000003 HC RX 258: Performed by: ANESTHESIOLOGY

## 2023-10-23 PROCEDURE — 3700000000 HC ANESTHESIA ATTENDED CARE: Performed by: OPHTHALMOLOGY

## 2023-10-23 PROCEDURE — 6360000002 HC RX W HCPCS: Performed by: REGISTERED NURSE

## 2023-10-23 PROCEDURE — 2580000003 HC RX 258: Performed by: OPHTHALMOLOGY

## 2023-10-23 PROCEDURE — 6360000002 HC RX W HCPCS: Performed by: OPHTHALMOLOGY

## 2023-10-23 PROCEDURE — 7100000000 HC PACU RECOVERY - FIRST 15 MIN: Performed by: OPHTHALMOLOGY

## 2023-10-23 PROCEDURE — 6370000000 HC RX 637 (ALT 250 FOR IP): Performed by: OPHTHALMOLOGY

## 2023-10-23 PROCEDURE — C1783 OCULAR IMP, AQUEOUS DRAIN DE: HCPCS | Performed by: OPHTHALMOLOGY

## 2023-10-23 DEVICE — LENS CLAREON IOL 21.5D: Type: IMPLANTABLE DEVICE | Site: EYE | Status: FUNCTIONAL

## 2023-10-23 DEVICE — TRABECULAR MICRO-BYPASS STENT SYSTEM - LEFT
Type: IMPLANTABLE DEVICE | Site: EYE | Status: FUNCTIONAL
Brand: ISTENT

## 2023-10-23 RX ORDER — ONDANSETRON 2 MG/ML
4 INJECTION INTRAMUSCULAR; INTRAVENOUS
Status: DISCONTINUED | OUTPATIENT
Start: 2023-10-23 | End: 2023-10-23 | Stop reason: HOSPADM

## 2023-10-23 RX ORDER — ACETAMINOPHEN 650 MG
TABLET, EXTENDED RELEASE ORAL PRN
Status: DISCONTINUED | OUTPATIENT
Start: 2023-10-23 | End: 2023-10-23 | Stop reason: ALTCHOICE

## 2023-10-23 RX ORDER — TETRACAINE HYDROCHLORIDE 5 MG/ML
SOLUTION OPHTHALMIC PRN
Status: DISCONTINUED | OUTPATIENT
Start: 2023-10-23 | End: 2023-10-23 | Stop reason: ALTCHOICE

## 2023-10-23 RX ORDER — TROPICAMIDE 10 MG/ML
SOLUTION/ DROPS OPHTHALMIC
Status: COMPLETED
Start: 2023-10-23 | End: 2023-10-23

## 2023-10-23 RX ORDER — SODIUM CHLORIDE 0.9 % (FLUSH) 0.9 %
5-40 SYRINGE (ML) INJECTION PRN
Status: DISCONTINUED | OUTPATIENT
Start: 2023-10-23 | End: 2023-10-23 | Stop reason: HOSPADM

## 2023-10-23 RX ORDER — SODIUM CHLORIDE, SODIUM LACTATE, POTASSIUM CHLORIDE, CALCIUM CHLORIDE 600; 310; 30; 20 MG/100ML; MG/100ML; MG/100ML; MG/100ML
INJECTION, SOLUTION INTRAVENOUS CONTINUOUS
Status: DISCONTINUED | OUTPATIENT
Start: 2023-10-23 | End: 2023-10-23 | Stop reason: HOSPADM

## 2023-10-23 RX ORDER — FENTANYL CITRATE 50 UG/ML
25 INJECTION, SOLUTION INTRAMUSCULAR; INTRAVENOUS EVERY 5 MIN PRN
Status: DISCONTINUED | OUTPATIENT
Start: 2023-10-23 | End: 2023-10-23 | Stop reason: HOSPADM

## 2023-10-23 RX ORDER — SODIUM CHLORIDE 9 MG/ML
INJECTION, SOLUTION INTRAVENOUS PRN
Status: DISCONTINUED | OUTPATIENT
Start: 2023-10-23 | End: 2023-10-23 | Stop reason: HOSPADM

## 2023-10-23 RX ORDER — KETOROLAC TROMETHAMINE 30 MG/ML
INJECTION, SOLUTION INTRAMUSCULAR; INTRAVENOUS PRN
Status: DISCONTINUED | OUTPATIENT
Start: 2023-10-23 | End: 2023-10-23 | Stop reason: SDUPTHER

## 2023-10-23 RX ORDER — CIPROFLOXACIN HYDROCHLORIDE 3.5 MG/ML
SOLUTION/ DROPS TOPICAL
Status: COMPLETED
Start: 2023-10-23 | End: 2023-10-23

## 2023-10-23 RX ORDER — OXYCODONE HYDROCHLORIDE 5 MG/1
5 TABLET ORAL PRN
Status: DISCONTINUED | OUTPATIENT
Start: 2023-10-23 | End: 2023-10-23 | Stop reason: HOSPADM

## 2023-10-23 RX ORDER — ACETAMINOPHEN 500 MG
TABLET ORAL
Status: COMPLETED
Start: 2023-10-23 | End: 2023-10-23

## 2023-10-23 RX ORDER — SODIUM CHLORIDE 0.9 % (FLUSH) 0.9 %
5-40 SYRINGE (ML) INJECTION EVERY 12 HOURS SCHEDULED
Status: DISCONTINUED | OUTPATIENT
Start: 2023-10-23 | End: 2023-10-23 | Stop reason: HOSPADM

## 2023-10-23 RX ORDER — NEOMYCIN SULFATE, POLYMYXIN B SULFATE, AND DEXAMETHASONE 3.5; 10000; 1 MG/G; [USP'U]/G; MG/G
OINTMENT OPHTHALMIC PRN
Status: DISCONTINUED | OUTPATIENT
Start: 2023-10-23 | End: 2023-10-23 | Stop reason: ALTCHOICE

## 2023-10-23 RX ORDER — MIDAZOLAM HYDROCHLORIDE 1 MG/ML
2 INJECTION, SOLUTION INTRAMUSCULAR; INTRAVENOUS
Status: DISCONTINUED | OUTPATIENT
Start: 2023-10-23 | End: 2023-10-23 | Stop reason: HOSPADM

## 2023-10-23 RX ORDER — TIMOLOL MALEATE 5 MG/ML
1 SOLUTION/ DROPS OPHTHALMIC ONCE
Status: COMPLETED | OUTPATIENT
Start: 2023-10-23 | End: 2023-10-23

## 2023-10-23 RX ORDER — OXYCODONE HYDROCHLORIDE 5 MG/1
10 TABLET ORAL PRN
Status: DISCONTINUED | OUTPATIENT
Start: 2023-10-23 | End: 2023-10-23 | Stop reason: HOSPADM

## 2023-10-23 RX ORDER — MEPERIDINE HYDROCHLORIDE 25 MG/ML
12.5 INJECTION INTRAMUSCULAR; INTRAVENOUS; SUBCUTANEOUS EVERY 5 MIN PRN
Status: DISCONTINUED | OUTPATIENT
Start: 2023-10-23 | End: 2023-10-23 | Stop reason: HOSPADM

## 2023-10-23 RX ORDER — ONDANSETRON 2 MG/ML
INJECTION INTRAMUSCULAR; INTRAVENOUS PRN
Status: DISCONTINUED | OUTPATIENT
Start: 2023-10-23 | End: 2023-10-23 | Stop reason: SDUPTHER

## 2023-10-23 RX ORDER — CAFFEINE CITRATE 20 MG/ML
60 SOLUTION INTRAVENOUS ONCE
Status: COMPLETED | OUTPATIENT
Start: 2023-10-23 | End: 2023-10-23

## 2023-10-23 RX ORDER — CAFFEINE CITRATE 20 MG/ML
SOLUTION INTRAVENOUS
Status: DISPENSED
Start: 2023-10-23 | End: 2023-10-23

## 2023-10-23 RX ORDER — FENTANYL CITRATE 50 UG/ML
INJECTION, SOLUTION INTRAMUSCULAR; INTRAVENOUS PRN
Status: DISCONTINUED | OUTPATIENT
Start: 2023-10-23 | End: 2023-10-23 | Stop reason: SDUPTHER

## 2023-10-23 RX ORDER — MIDAZOLAM HYDROCHLORIDE 1 MG/ML
INJECTION INTRAMUSCULAR; INTRAVENOUS PRN
Status: DISCONTINUED | OUTPATIENT
Start: 2023-10-23 | End: 2023-10-23 | Stop reason: SDUPTHER

## 2023-10-23 RX ORDER — ACETAMINOPHEN 500 MG
1000 TABLET ORAL
Status: COMPLETED | OUTPATIENT
Start: 2023-10-23 | End: 2023-10-23

## 2023-10-23 RX ORDER — DROPERIDOL 2.5 MG/ML
0.62 INJECTION, SOLUTION INTRAMUSCULAR; INTRAVENOUS
Status: DISCONTINUED | OUTPATIENT
Start: 2023-10-23 | End: 2023-10-23 | Stop reason: HOSPADM

## 2023-10-23 RX ORDER — LIDOCAINE HYDROCHLORIDE 10 MG/ML
1 INJECTION, SOLUTION EPIDURAL; INFILTRATION; INTRACAUDAL; PERINEURAL
Status: DISCONTINUED | OUTPATIENT
Start: 2023-10-23 | End: 2023-10-23 | Stop reason: HOSPADM

## 2023-10-23 RX ADMIN — FENTANYL CITRATE 50 MCG: 50 INJECTION, SOLUTION INTRAMUSCULAR; INTRAVENOUS at 12:00

## 2023-10-23 RX ADMIN — SODIUM CHLORIDE: 900 INJECTION INTRAVENOUS at 11:07

## 2023-10-23 RX ADMIN — CIPROFLOXACIN HYDROCHLORIDE 1 DROP: 3.5 SOLUTION/ DROPS TOPICAL at 11:15

## 2023-10-23 RX ADMIN — CIPROFLOXACIN HYDROCHLORIDE 1 DROP: 3.5 SOLUTION/ DROPS TOPICAL at 11:07

## 2023-10-23 RX ADMIN — KETOROLAC TROMETHAMINE 15 MG: 30 INJECTION, SOLUTION INTRAMUSCULAR; INTRAVENOUS at 12:21

## 2023-10-23 RX ADMIN — TROPICAMIDE 1 DROP: 10 SOLUTION/ DROPS OPHTHALMIC at 11:16

## 2023-10-23 RX ADMIN — TROPICAMIDE 1 DROP: 10 SOLUTION/ DROPS OPHTHALMIC at 11:07

## 2023-10-23 RX ADMIN — CIPROFLOXACIN HYDROCHLORIDE 1 DROP: 3.5 SOLUTION/ DROPS TOPICAL at 11:25

## 2023-10-23 RX ADMIN — ACETAMINOPHEN 1000 MG: 500 TABLET ORAL at 12:51

## 2023-10-23 RX ADMIN — ONDANSETRON HYDROCHLORIDE 4 MG: 2 INJECTION, SOLUTION INTRAMUSCULAR; INTRAVENOUS at 12:00

## 2023-10-23 RX ADMIN — CIPROFLOXACIN 1 DROP: 3 SOLUTION OPHTHALMIC at 11:07

## 2023-10-23 RX ADMIN — Medication 1000 MG: at 12:51

## 2023-10-23 RX ADMIN — SODIUM CHLORIDE: 900 INJECTION INTRAVENOUS at 11:30

## 2023-10-23 RX ADMIN — TROPICAMIDE 1 DROP: 10 SOLUTION/ DROPS OPHTHALMIC at 11:25

## 2023-10-23 RX ADMIN — SODIUM CHLORIDE 1.5 MG: 9 INJECTION INTRAMUSCULAR; INTRAVENOUS; SUBCUTANEOUS at 12:31

## 2023-10-23 RX ADMIN — MIDAZOLAM HYDROCHLORIDE 2 MG: 1 INJECTION, SOLUTION INTRAMUSCULAR; INTRAVENOUS at 11:51

## 2023-10-23 RX ADMIN — CAFFEINE CITRATE 60 MG: 20 INJECTION INTRAVENOUS at 11:30

## 2023-10-23 ASSESSMENT — PAIN DESCRIPTION - DESCRIPTORS: DESCRIPTORS: ACHING

## 2023-10-23 ASSESSMENT — PAIN SCALES - GENERAL
PAINLEVEL_OUTOF10: 3
PAINLEVEL_OUTOF10: 3

## 2023-10-23 ASSESSMENT — PAIN - FUNCTIONAL ASSESSMENT: PAIN_FUNCTIONAL_ASSESSMENT: 0-10

## 2023-10-23 ASSESSMENT — PAIN DESCRIPTION - LOCATION: LOCATION: EYE

## 2023-10-23 ASSESSMENT — PAIN DESCRIPTION - ORIENTATION: ORIENTATION: LEFT

## 2023-10-23 NOTE — ANESTHESIA POSTPROCEDURE EVALUATION
Department of Anesthesiology  Postprocedure Note    Patient: Fransico Pineda  MRN: [id number]  YOB: 1945  Date of evaluation: 10/23/2023      Procedure Summary     Date: 10/23/23 Room / Location: [facility] ASU B2 / [facility] AMBULATORY OR    Anesthesia Start: 1147 Anesthesia Stop: 6305    Procedure: LEFT EYE CATARACT EMULSIFICATION IOL IMPLANT WITH I-STENT (Left: Eye) Diagnosis:       Primary open angle glaucoma of left eye, moderate stage      Age-related nuclear cataract, left eye      (Primary open angle glaucoma of left eye, moderate stage [H40.1122])      (Age-related nuclear cataract, left eye [H25.12])    Surgeons: Yari Bajwa MD Responsible Provider: Lawyer Aida MD    Anesthesia Type: MAC ASA Status: 3          Anesthesia Type: MAC    Araseli Phase I: Araseli Score: 10    Araseli Phase II:        Anesthesia Post Evaluation    Patient location during evaluation: PACU  Patient participation: complete - patient participated  Level of consciousness: awake and alert  Pain score: 3  Airway patency: patent  Nausea & Vomiting: no nausea and no vomiting  Complications: no  Cardiovascular status: hemodynamically stable  Respiratory status: acceptable  Hydration status: euvolemic  Multimodal analgesia pain management approach  Pain management: satisfactory to patient

## 2023-10-23 NOTE — DISCHARGE INSTRUCTIONS
>>>You received Tylenol 1000mg prior to your surgery, you may take Tylenol (or pain medication containing Tylenol or Acetaminophen) in 6 hours at 7 pm today. <<<       D Matthew Larson MD  [address]   [facility] [address] [facility] [address]  [place] [address] [place]  Phone: [phone]  If you are unable to keep appointment, kindly give 24 hours notice please. REMOVE PATCH  START DROPS WHEN YOU GET HOME  PUT PATCH BACK ON AT BEDTIME    DO NOT RUB the eye that was operated on. Do not strain excessively. It is all right to bend as long as you do not strain. It is safe to take a shower, wash your face, and wash your hair. Just keep the eye closed. Do not swim for 1 week after surgery. If you have any problems or questions, do not hesitate to call [phone]. Follow instructions on eye drops from office. You may take Tylenol or Advil for discomfort. If it pressure not relieved by Tylenol or Advil, please call Dr. Tarik Choi office. TO PREVENT AN INFECTION      [address] [place] [address] YOUR HANDS    To prevent infection, good handwashing is the most important thing you or your caregiver can do. Wash your hands with soap and water or use the hand  we gave you before you touch any wounds. USE CLEAN SHEETS    Use freshly cleaned sheets on your bed after surgery. To keep the surgery site clean, do not allow pets to sleep with you while your wound is still healing. STOP SMOKING    Stop smoking, or at least cut back on smoking    Smoking slows your healing. CONTROL YOUR BLOOD SUGAR    High blood sugars slow wound healing. If you are diabetic, control your blood sugar levels before and after your surgery. If you were given prescriptions, please review the written information on the prescribed medications. DO NOT DRIVE WHILE TAKING NARCOTIC PAIN MEDICATIONS.     DISCHARGE SUMMARY from Nurse    The following personal items collected during your admission are returned to you:   Dental Appliance:

## 2023-10-23 NOTE — PERIOP NOTE
Antelmo Carlisle Keegan  1945  [id number]    Situation:  Verbal report given from: Libra Gutierrez, [address].  Vincenzo Klein RN  Procedure: Procedure(s):  LEFT EYE CATARACT EMULSIFICATION IOL IMPLANT WITH I-STENT    Background:    Preoperative diagnosis: Primary open angle glaucoma of left eye, moderate stage [H40.1122]  Age-related nuclear cataract, left eye [H25.12]    Postoperative diagnosis: * No post-op diagnosis entered *    :  Dr. Miryam Ibrahim    Assistant(s): Circulator: Stevo Spivey RN  Relief Circulator: Georgia Weinberg RN  Relief Scrub: Jermain Jiménez  Scrub Person First: Yvonne Willoughby    Specimens: * No specimens in log *    Assessment:  Intra-procedure medications         Anesthesia gave intra-procedure sedation and medications, see anesthesia flow sheet     Intravenous fluids: LR@ KVO     Vital signs stable       Recommendation:    Permission to share finding with Emma Stauffer

## 2023-10-23 NOTE — PERIOP NOTE
Permission received to review discharge instructions and discuss private health information with Fabrice Stephenson and will have someone with them after discharge   Patient states that family/friend will be with them for at least 24 hours following today's procedure.  .  Warm blanket given to pt

## 2023-10-23 NOTE — PERIOP NOTE
Assumed care of pt, pt awake and alert, denying pain. VSS    1251-Pt now having pain in head/eye, medicated with tylenol 1000 mg per pacu orders. VSS  Pt continues to tolerate liquids. 1300-D/c instructions reviewed, all questions answered. Reviewed with Charlie- significant other. Reviewed when to call the doctor,diet,activity and hygiene. Reviewed when to start eye drops, to wear shield with sleeping and napping and to be careful walking around. 0- Transported via w/c to awaiting transportation. No complaints noted at time of d/c home.

## 2023-10-23 NOTE — BRIEF OP NOTE
Brief Postoperative Note      Patient: Berta Guerrero  YOB: 1945  MRN: [id number]    Date of Procedure: 10/23/2023    Pre-Op Diagnosis Codes:     * Primary open angle glaucoma of left eye, moderate stage [H40.1122]     * Age-related nuclear cataract, left eye [H25.12]    Post-Op Diagnosis: Nuclear Sclerotic cataract left eye H25.12, Open angle glaucoma left eye H40.1122       Procedure(s):  LEFT EYE CATARACT EMULSIFICATION IOL IMPLANT WITH I-STENT    Surgeon(s):  Baltazar Duncan MD    Assistant:  * No surgical staff found *    Anesthesia: Monitor Anesthesia Care    Estimated Blood Loss (mL): 0    Complications: none    Specimens:   * No specimens in log *    Implants:  Implant Name Type Inv. Item Serial No.  Lot No. LRB No. Used Action   LENS CLAREON IOL 21.5D - [id number]  LENS CLAREON IOL 21.5D [id number] [address] [place] [address]- N/A Left 1 Implanted   STENT OPHTH L EYE TRABECULAR FLAVIO BYPS ISTNT - [id number]  STENT OPHTH L EYE TRABECULAR FLAVIO BYPS ISTNT [id number] Left 1 Implanted         Drains: * No LDAs found *    Findings: Visually significant cataract open angle glaucoma left eye for cataract extraction, lens implant with Istent left eye.       Electronically signed by Baltazar Duncan MD on 10/23/2023 at 12:39 PM
Detail Level: Simple
Quality 226: Preventive Care And Screening: Tobacco Use: Screening And Cessation Intervention: Patient screened for tobacco use and is an ex/non-smoker
Quality 130: Documentation Of Current Medications In The Medical Record: Current Medications Documented
Quality 431: Preventive Care And Screening: Unhealthy Alcohol Use - Screening: Patient screened for unhealthy alcohol use using a single question and scores less than 2 times per year

## 2023-10-23 NOTE — OP NOTE
was then placed. Additional viscoat was used nasally to separate the iris from the cornea in the nasal angle. The patinet's head was rotated nasally 45 degrees and the operating microscope was turned toward the surgeon 45 degrees. Viscoat was placed on the corneal surface and a gonio prism was placed on the cornea. The trabecular meshwork was clearly visible after magnification was increased and the focusing. The I-stent was inspected and was found to be in good condition on the , and it was introduced into the eye through the main incision. Under direct visualization the I-stent was engaged in the trabecular meshwork nasally and pushed into position. There was slight bleeding from the I-stent. It was strummed and tapped with the  to insure proper positioning. The  was removed from the eye and the head was rotated back to the normal position. Residual Provisc and Viscoat were removed using I / A. The eye was flushed with BSS through the paracentesis. Cefuroxime 0.3 ml was instilled into the anterior chamber through the paracentesis. Betadine solution was placed on the conjunctival surface at the end of the case. The eye was left soft and formed at the end of the case. The incision site was water tight. The speculum was removed, and a drop of timolol 0.5% and neomycin/polymixin/dexamethasone ointment was placed on the eye followed by a shield. The patient tolerated the procedure well and is to follow-up in one day.     Electronically signed by Charito Banda MD on 10/23/2023 at 12:36 PM

## 2024-01-17 ENCOUNTER — TRANSCRIBE ORDERS (OUTPATIENT)
Facility: HOSPITAL | Age: 79
End: 2024-01-17

## 2024-01-17 DIAGNOSIS — Z12.31 SCREENING MAMMOGRAM FOR HIGH-RISK PATIENT: Primary | ICD-10-CM

## 2024-02-07 ENCOUNTER — HOSPITAL ENCOUNTER (OUTPATIENT)
Facility: HOSPITAL | Age: 79
Discharge: HOME | End: 2024-02-10
Payer: MEDICARE

## 2024-02-07 DIAGNOSIS — Z12.31 SCREENING MAMMOGRAM FOR HIGH-RISK PATIENT: ICD-10-CM

## 2024-02-07 PROCEDURE — 77063 BREAST TOMOSYNTHESIS BI: CPT

## 2025-02-05 ENCOUNTER — TRANSCRIBE ORDERS (OUTPATIENT)
Facility: HOSPITAL | Age: 80
End: 2025-02-05

## 2025-02-05 DIAGNOSIS — Z12.31 VISIT FOR SCREENING MAMMOGRAM: Primary | ICD-10-CM

## 2025-03-19 ENCOUNTER — TRANSCRIBE ORDERS (OUTPATIENT)
Facility: HOSPITAL | Age: 80
End: 2025-03-19

## 2025-03-19 DIAGNOSIS — Z13.820 ENCOUNTER FOR SCREENING FOR OSTEOPOROSIS: ICD-10-CM

## 2025-03-19 DIAGNOSIS — N95.1 SYMPTOMATIC MENOPAUSAL OR FEMALE CLIMACTERIC STATES: Primary | ICD-10-CM

## 2025-04-16 ENCOUNTER — HOSPITAL ENCOUNTER (OUTPATIENT)
Facility: HOSPITAL | Age: 80
Discharge: HOME | End: 2025-04-19
Payer: MEDICARE

## 2025-04-16 DIAGNOSIS — Z12.31 VISIT FOR SCREENING MAMMOGRAM: ICD-10-CM

## 2025-04-16 PROCEDURE — 77063 BREAST TOMOSYNTHESIS BI: CPT

## 2025-05-07 ENCOUNTER — HOSPITAL ENCOUNTER (OUTPATIENT)
Facility: HOSPITAL | Age: 80
Discharge: HOME | End: 2025-05-10
Payer: MEDICARE

## 2025-05-07 DIAGNOSIS — N95.1 SYMPTOMATIC MENOPAUSAL OR FEMALE CLIMACTERIC STATES: ICD-10-CM

## 2025-05-07 DIAGNOSIS — Z13.820 ENCOUNTER FOR SCREENING FOR OSTEOPOROSIS: ICD-10-CM

## 2025-05-07 PROCEDURE — 77080 DXA BONE DENSITY AXIAL: CPT

## 2025-08-19 PROBLEM — M81.0 POSTMENOPAUSAL OSTEOPOROSIS: Status: ACTIVE | Noted: 2025-08-19

## 2025-08-27 ENCOUNTER — HOSPITAL ENCOUNTER (OUTPATIENT)
Facility: HOSPITAL | Age: 80
Setting detail: INFUSION SERIES
Discharge: HOME | End: 2025-08-27
Payer: MEDICARE

## 2025-08-27 VITALS
HEART RATE: 76 BPM | SYSTOLIC BLOOD PRESSURE: 141 MMHG | OXYGEN SATURATION: 93 % | TEMPERATURE: 98.3 F | DIASTOLIC BLOOD PRESSURE: 74 MMHG | RESPIRATION RATE: 16 BRPM

## 2025-08-27 DIAGNOSIS — M81.0 POSTMENOPAUSAL OSTEOPOROSIS: Primary | ICD-10-CM

## 2025-08-27 LAB
ANION GAP BLD CALC-SCNC: 6.3 MMOL/L (ref 10–20)
CA-I BLD-MCNC: 1.15 MMOL/L (ref 1.15–1.33)
CHLORIDE BLD-SCNC: 107 MMOL/L (ref 98–107)
CO2 BLD-SCNC: 25.7 MMOL/L (ref 21–32)
CREAT BLD-MCNC: 0.7 MG/DL (ref 0.6–1.3)
GLUCOSE BLD-MCNC: 85 MG/DL (ref 74–99)
MAGNESIUM SERPL-MCNC: 2.1 MG/DL (ref 1.6–2.4)
PHOSPHATE SERPL-MCNC: 3.7 MG/DL (ref 2.5–4.5)
POTASSIUM BLD-SCNC: 4 MMOL/L (ref 3.5–5.1)
SERVICE CMNT-IMP: ABNORMAL
SODIUM BLD-SCNC: 139 MMOL/L (ref 136–145)

## 2025-08-27 PROCEDURE — 6360000002 HC RX W HCPCS: Performed by: MASSAGE THERAPIST

## 2025-08-27 PROCEDURE — 96372 THER/PROPH/DIAG INJ SC/IM: CPT

## 2025-08-27 PROCEDURE — 80047 BASIC METABLC PNL IONIZED CA: CPT

## 2025-08-27 PROCEDURE — 83735 ASSAY OF MAGNESIUM: CPT

## 2025-08-27 PROCEDURE — 36415 COLL VENOUS BLD VENIPUNCTURE: CPT

## 2025-08-27 PROCEDURE — 84100 ASSAY OF PHOSPHORUS: CPT

## 2025-08-27 RX ADMIN — DENOSUMAB 60 MG: 60 INJECTION SUBCUTANEOUS at 11:27

## (undated) DEVICE — TOWEL SURG W17XL27IN STD BLU COT NONFENESTRATED PREWASHED

## (undated) DEVICE — THE MONARCH® "D" CARTRIDGE IS A SINGLE-USE POLYPROPYLENE CARTRIDGE FOR POSTERIOR CHAMBER IOL DELIVERY: Brand: MONARCH® III

## (undated) DEVICE — GLOVE SURG SZ 75 CRM LTX FREE POLYISOPRENE POLYMER BEAD ANTI

## (undated) DEVICE — DEVON™ KNEE AND BODY STRAP 60" X 3" (1.5 M X 7.6 CM): Brand: DEVON

## (undated) DEVICE — SET ADMIN 16ML TBNG L100IN 2 Y INJ SITE IV PIGGY BK DISP

## (undated) DEVICE — ENDO CARRY-ON PROCEDURE KIT INCLUDES ENZYMATIC SPONGE, GAUZE, BIOHAZARD LABEL, TRAY, LUBRICANT, DIRTY SCOPE LABEL, WATER LABEL, TRAY, DRAWSTRING PAD, AND DEFENDO 4-PIECE KIT.: Brand: ENDO CARRY-ON PROCEDURE KIT

## (undated) DEVICE — Z DISCONTINUED NO SUB IDED SET EXTN W/ 4 W STPCOCK M SPIN LOK 36IN

## (undated) DEVICE — Device

## (undated) DEVICE — FORCEPS BX L240CM JAW DIA2.8MM L CAP W/ NDL MIC MESH TOOTH

## (undated) DEVICE — BAG BELONG PT PERS CLEAR HANDL

## (undated) DEVICE — BLOCK BITE ENDOSCP AD 21 MM W/ DIL BLU LF DISP

## (undated) DEVICE — REM POLYHESIVE ADULT PATIENT RETURN ELECTRODE: Brand: VALLEYLAB

## (undated) DEVICE — Z DISCONTINUED PER MEDLINE LINE GAS SAMPLING O2/CO2 LNG AD 13 FT NSL W/ TBNG FILTERLINE

## (undated) DEVICE — SYRINGE MED 30ML STD CLR PLAS LUERLOCK TIP N CTRL DISP

## (undated) DEVICE — CATHETER ELECTROPHYSIOLOGY CRD 2 5 MM 6 FRX120 CM SUPREME

## (undated) DEVICE — STAIN INDIA INK IN NACL 10ML --

## (undated) DEVICE — BW-412T DISP COMBO CLEANING BRUSH: Brand: SINGLE USE COMBINATION CLEANING BRUSH

## (undated) DEVICE — SYR 3ML LL TIP 1/10ML GRAD --

## (undated) DEVICE — SURGICAL PROCEDURE PACK EYE CUST DR CHNDLR

## (undated) DEVICE — CATHETER IV 20GA L1.16IN OD1.0414-1.1176MM ID0.762-0.8382MM

## (undated) DEVICE — Device: Brand: MEDICAL ACTION INDUSTRIES

## (undated) DEVICE — CATHETER IV 22GA L1IN OD0.8382-0.9144MM ID0.6096-0.6858MM 382523

## (undated) DEVICE — TOWEL 4 PLY TISS 19X30 SUE WHT

## (undated) DEVICE — SURGICAL PROCEDURE PACK CATRCT CUST

## (undated) DEVICE — 3M™ TEGADERM™ TRANSPARENT FILM DRESSING FRAME STYLE, 1624W, 2-3/8 IN X 2-3/4 IN (6 CM X 7 CM), 100/CT 4CT/CASE: Brand: 3M™ TEGADERM™

## (undated) DEVICE — BLOCK BITE ENDOSCOPIC LF FM PROTCT

## (undated) DEVICE — KENDALL RADIOLUCENT FOAM MONITORING ELECTRODE RECTANGULAR SHAPE: Brand: KENDALL

## (undated) DEVICE — QUILTED PREMIUM COMFORT UNDERPAD,EXTRA HEAVY: Brand: WINGS

## (undated) DEVICE — NEONATAL-ADULT SPO2 SENSOR: Brand: NELLCOR

## (undated) DEVICE — SYRINGE MED 20ML STD CLR PLAS LUERLOCK TIP N CTRL DISP

## (undated) DEVICE — BITE BLK ENDOSCP AD 54FR GRN POLYETH ENDOSCP W STRP SLD

## (undated) DEVICE — 1200 GUARD II KIT W/5MM TUBE W/O VAC TUBE: Brand: GUARDIAN

## (undated) DEVICE — CATH IV AUTOGRD BC PNK 20GA 25 -- INSYTE

## (undated) DEVICE — DEVICE DEL CAP ADV 2.5MMX180CM --

## (undated) DEVICE — FIAPC® PROBE W/ FILTER 2200 A OD 2.3MM/6.9FR; L 2.2M/7.2FT: Brand: ERBE

## (undated) DEVICE — AGENT OPHTH SURG DUOVISC 30MG/ML NAHA 0.35ML OR 0.5ML

## (undated) DEVICE — IV START KIT: Brand: MEDLINE

## (undated) DEVICE — MEDI-VAC YANK SUCT HNDL W/TPRD BULBOUS TIP: Brand: CARDINAL HEALTH

## (undated) DEVICE — CONTINU-FLO SOLUTION SET, 2 INJECTION SITES, MALE LUER LOCK ADAPTER WITH RETRACTABLE COLLAR, LARGE BORE STOPCOCK WITH ROTATING MALE LUER LOCK EXTENSION SET, 2 INJECTION SITES, MALE LUER LOCK ADAPTER WITH RETRACTABLE COLLAR: Brand: INTERLINK/CONTINU-FLO

## (undated) DEVICE — SNARE ENDOSCP POLYP MED STD AD 2.4X27X240 CM 2.8 MM OVL SENS

## (undated) DEVICE — CLIP INT L235CM WRK CHAN DIA2.8MM OPN 11MM LCK MECHANISM MR

## (undated) DEVICE — TIP SUCT TRNSPAR RIB SURF STD BLB RIG NVENT W/ 5IN1 CONN DYND50138] MEDLINE INDUSTRIES INC]

## (undated) DEVICE — BASIN EMSIS 16OZ GRAPHITE PLAS KID SHP MOLD GRAD FOR ORAL

## (undated) DEVICE — RESOLUTION 360 CLIP

## (undated) DEVICE — SOLIDIFIER MEDC 1200ML -- CONVERT TO 356117

## (undated) DEVICE — SOLUTION IRRIG 500ML STRL H2O NONPYROGENIC

## (undated) DEVICE — CUFF BLD PRSS AD CLTH SGL TB W/ BAYNT CONN ROUNDED CORNER

## (undated) DEVICE — TRABECULAR MICRO-BYPASS STENT SYSTEM - LEFT
Type: IMPLANTABLE DEVICE | Site: EYE | Status: NON-FUNCTIONAL
Brand: ISTENT

## (undated) DEVICE — CATHETER IV 18GA L1.16IN OD1.27-1.3462MM ID0.9398-1.016MM

## (undated) DEVICE — CANNULA CUSH AD W/ 14FT TBG

## (undated) DEVICE — CATH IV AUTOGRD BC BLU 22GA 25 -- INSYTE

## (undated) DEVICE — BAG SPEC BIOHZRD 10 X 10 IN --

## (undated) DEVICE — CONNECTOR ELECSURG ARCONNECT AR PRB SGL USE DISP

## (undated) DEVICE — ELECTRODE PT RET AD L9FT HI MOIST COND ADH HYDRGEL CORDED

## (undated) DEVICE — SOLUTION LACTATED RINGERS INJECTION USP

## (undated) DEVICE — ENDOSCOPIC KIT COMPLIANCE ENDOKIT

## (undated) DEVICE — SINGLE USE SPLINTING TUBE: Brand: SINGLE USE SPLINTING TUBE

## (undated) DEVICE — FILTER IV 5UM L1.75IN FLX STRW FOR FLD ASPIR FR GLS AMP

## (undated) DEVICE — KENDALL RADIOLUCENT FOAM MONITORING ELECTRODE -RECTANGULAR SHAPE: Brand: KENDALL

## (undated) DEVICE — NEEDLE HYPO 18GA L1.5IN PNK S STL HUB POLYPR SHLD REG BVL

## (undated) DEVICE — SYR 10ML LUER LOK 1/5ML GRAD --

## (undated) DEVICE — CATHETER IV 24GA L0.75IN OD0.6604-0.7366MM

## (undated) DEVICE — CONTAINER SPEC 20 ML LID NEUT BUFF FORMALIN 10 % POLYPR STS

## (undated) DEVICE — ENDOSCOPY PUMP TUBING/ CAP SET: Brand: ERBE

## (undated) DEVICE — SOLIDIFIER FLUID 3000 CC ABSORB

## (undated) DEVICE — KIT,1200CC CANISTER,3/16"X6' TUBING: Brand: MEDLINE INDUSTRIES, INC.

## (undated) DEVICE — KENDALL DL ECG CABLE AND LEAD WIRE SYSTEM, 3-LEAD, SINGLE PATIENT USE: Brand: KENDALL

## (undated) DEVICE — CANN NASAL O2 CAPNOGRAPHY AD -- FILTERLINE

## (undated) DEVICE — NEEDLE SCLERO 23GA L4MM CATH L240CM CNTRST SHTH DIA1.8MM

## (undated) DEVICE — SYSTEM REPROC CBL 3 LD DISPOSABLE

## (undated) DEVICE — CAPSULE ENDOSCP L26.2MM DIA11.4MM BIOCOMPATIBLE PLAS SB 3

## (undated) DEVICE — FORCEPS BX L160CM DIA8MM GRSP DISECT CUP TIP NONLOCKING ROT

## (undated) DEVICE — SET GRAV CK VLV NEEDLESS ST 3 GANGED 4WAY STPCOCK HI FLO 10